# Patient Record
Sex: MALE | Race: WHITE | Employment: OTHER | URBAN - METROPOLITAN AREA
[De-identification: names, ages, dates, MRNs, and addresses within clinical notes are randomized per-mention and may not be internally consistent; named-entity substitution may affect disease eponyms.]

---

## 2017-02-03 ENCOUNTER — OFFICE VISIT (OUTPATIENT)
Dept: INTERNAL MEDICINE CLINIC | Age: 62
End: 2017-02-03

## 2017-02-03 VITALS
TEMPERATURE: 98.5 F | WEIGHT: 178 LBS | BODY MASS INDEX: 24.92 KG/M2 | DIASTOLIC BLOOD PRESSURE: 81 MMHG | RESPIRATION RATE: 12 BRPM | SYSTOLIC BLOOD PRESSURE: 141 MMHG | HEIGHT: 71 IN | HEART RATE: 62 BPM

## 2017-02-03 DIAGNOSIS — R10.32 COLICKY LLQ ABDOMINAL PAIN: Primary | ICD-10-CM

## 2017-02-03 DIAGNOSIS — K59.00 CONSTIPATION, UNSPECIFIED CONSTIPATION TYPE: ICD-10-CM

## 2017-02-03 RX ORDER — POLYETHYLENE GLYCOL 3350 17 G/17G
17 POWDER, FOR SOLUTION ORAL DAILY
Qty: 225 G | Refills: 3
Start: 2017-02-03 | End: 2019-07-17

## 2017-02-03 RX ORDER — AMOXICILLIN 250 MG
1 CAPSULE ORAL DAILY
COMMUNITY
End: 2019-07-17

## 2017-02-03 NOTE — MR AVS SNAPSHOT
Visit Information Date & Time Provider Department Dept. Phone Encounter #  
 2/3/2017  1:00 PM Artis Meyers MD [facility] [address] [id number] Your Appointments 4/5/2017 11:00 AM  
Office Visit with KATIA Gutierrez [address] [facility]) Appt Note: est pt: 6 mo skin exam  
 [facility] [address]  
[address] [place] [id number] Upcoming Health Maintenance Date Due Hepatitis C Screening 1955 INFLUENZA AGE 9 TO ADULT 8/1/2016 ZOSTER VACCINE AGE 60> 3/14/2017* COLONOSCOPY 3/14/2017* Pneumococcal 19-64 Medium Risk (1 of 1 - PPSV23) 11/9/2018* DTaP/Tdap/Td series (2 - Td) 6/3/2025 *Topic was postponed. The date shown is not the original due date. Allergies as of 2/3/2017  Review Complete On: 2/3/2017 By: Artis Meyers MD  
 No Known Allergies Current Immunizations  Reviewed on 12/14/2016 Name Date Td 1/1/2005 Tdap 6/3/2015 Not reviewed this visit You Were Diagnosed With   
  
 Codes Comments Colicky LLQ abdominal pain    -  Primary ICD-10-CM: R10.32 
ICD-9-CM: 789.04 Constipation, unspecified constipation type     ICD-10-CM: K59.00 ICD-9-CM: 564.00 Vitals BP Pulse Temp Resp Height(growth percentile) Weight(growth percentile) 141/81 (BP 1 Location: Left arm, BP Patient Position: Sitting) 62 98.5 °F (36.9 °C) 12 5' 11\" (1.803 m) 178 lb (80.7 kg) BMI Smoking Status 24.83 kg/m2 Current Every Day Smoker Vitals History BMI and BSA Data Body Mass Index Body Surface Area  
 24.83 kg/m 2 2.01 m 2 Preferred Pharmacy Pharmacy Name Phone [id number], [address] [address] [phone] Your Updated Medication List  
  
   
 This list is accurate as of: 2/3/17  1:38 PM.  Always use your most recent med list.  
  
  
  
  
 co-enzyme Q-10 100 mg capsule Commonly known as:  CO Q-10 Take 1 Cap by mouth daily. fluorouracil 5 % chemo cream  
Commonly known as:  EFUDEX Apply  to affected area two (2) times a day. polyethylene glycol 17 gram/dose powder Commonly known as:  Peng Chance Take 17 g by mouth daily. pravastatin 20 mg tablet Commonly known as:  PRAVACHOL  
TAKE 1 TABLET BY MOUTH AT BEDTIME  
  
 PROBIOTIC PO Take  by mouth. SENOKOT-S 8.6-50 mg per tablet Generic drug:  senna-docusate Take 1 Tab by mouth daily. TURMERIC (BULK)  
by Does Not Apply route. Introducing [facility] & HEALTH SERVICES! Norma Lora introduces [employer] patient portal. Now you can access parts of your medical record, email your doctor's office, and request medication refills online. 1. In your internet browser, go to [email or web address] 2. Click on the First Time User? Click Here link in the Sign In box. You will see the New Member Sign Up page. 3. Enter your [employer] Access Code exactly as it appears below. You will not need to use this code after youve completed the sign-up process. If you do not sign up before the expiration date, you must request a new code. · [employer] Access Code: [email or web address] Expires: 5/4/2017  1:24 PM 
 
4. Enter the last four digits of your Social Security Number (xxxx) and Date of Birth (mm/dd/yyyy) as indicated and click Submit. You will be taken to the next sign-up page. 5. Create a Sky [employer]est ID. This will be your [employer] login ID and cannot be changed, so think of one that is secure and easy to remember. 6. Create a [employer] password. You can change your password at any time. 7. Enter your Password Reset Question and Answer. This can be used at a later time if you forget your password. 8. Enter your e-mail address.  You will receive e-mail notification when new information is available in [employer]. 9. Click Sign Up. You can now view and download portions of your medical record. 10. Click the Download Summary menu link to download a portable copy of your medical information. If you have questions, please visit the Frequently Asked Questions section of the [employer] website. Remember, [employer] is NOT to be used for urgent needs. For medical emergencies, dial 911. Now available from your iPhone and Android! Please provide this summary of care documentation to your next provider. Your primary care clinician is listed as Prisca Boyle. If you have any questions after today's visit, please call [phone].

## 2017-02-15 ENCOUNTER — TELEPHONE (OUTPATIENT)
Dept: INTERNAL MEDICINE CLINIC | Age: 62
End: 2017-02-15

## 2017-02-15 DIAGNOSIS — R14.0 ABDOMINAL BLOATING: Primary | ICD-10-CM

## 2017-02-15 NOTE — TELEPHONE ENCOUNTER
Please make sure he has scheduled a GI visit since colonoscopy if needed to rule out mass or partial obstriction. I ordered a celiac antibody blood test which may help test for gluten intolerance. Can try a lactose -free diet for 48 hours, then drink 1 quart of milk and see if symptoms occur. This will test for lactose intolerance.    Needs to see Allergist for wheat or other testing

## 2017-02-22 ENCOUNTER — TELEPHONE (OUTPATIENT)
Dept: INTERNAL MEDICINE CLINIC | Age: 62
End: 2017-02-22

## 2017-02-22 NOTE — TELEPHONE ENCOUNTER
----- Message from Pema Godinez sent at 2/22/2017 11:28 AM EST -----  Regarding: Dr. Bacilio Carrington/Telephone  Contact: [phone]  Patient returning call to Nurse Laura Hong) from last thursday. Best contact number is [phone].

## 2017-02-27 ENCOUNTER — TELEPHONE (OUTPATIENT)
Dept: INTERNAL MEDICINE CLINIC | Age: 62
End: 2017-02-27

## 2017-02-27 NOTE — TELEPHONE ENCOUNTER
----- Message from Jeri Caro sent at 2/27/2017 11:44 AM EST -----  Regarding: Dr Reilly  Contact: [phone]  Pt is returning call from the office

## 2017-02-27 NOTE — TELEPHONE ENCOUNTER
Referred to allergist , Dr Addison Gold , pt will make appointment . Advised to have Celiac test done , will come for labs.

## 2017-03-16 PROBLEM — D12.8 TUBULAR ADENOMA OF RECTUM: Status: ACTIVE | Noted: 2017-02-01

## 2017-04-05 ENCOUNTER — OFFICE VISIT (OUTPATIENT)
Dept: DERMATOLOGY | Facility: AMBULATORY SURGERY CENTER | Age: 62
End: 2017-04-05

## 2017-04-05 ENCOUNTER — HOSPITAL ENCOUNTER (OUTPATIENT)
Dept: LAB | Age: 62
Discharge: HOME | End: 2017-04-05

## 2017-04-05 VITALS
BODY MASS INDEX: 24.92 KG/M2 | TEMPERATURE: 98.3 F | DIASTOLIC BLOOD PRESSURE: 71 MMHG | RESPIRATION RATE: 19 BRPM | SYSTOLIC BLOOD PRESSURE: 127 MMHG | OXYGEN SATURATION: 98 % | WEIGHT: 178 LBS | HEIGHT: 71 IN | HEART RATE: 56 BPM

## 2017-04-05 DIAGNOSIS — D22.9 MULTIPLE BENIGN NEVI: ICD-10-CM

## 2017-04-05 DIAGNOSIS — D48.5 NEOPLASM OF UNCERTAIN BEHAVIOR OF SKIN OF LOWER LEG: Primary | ICD-10-CM

## 2017-04-05 DIAGNOSIS — L81.4 LENTIGINES: ICD-10-CM

## 2017-04-05 DIAGNOSIS — D18.01 CHERRY ANGIOMA: ICD-10-CM

## 2017-04-05 DIAGNOSIS — L82.1 SEBORRHEIC KERATOSES: ICD-10-CM

## 2017-04-05 DIAGNOSIS — L90.5 SCAR: ICD-10-CM

## 2017-04-05 DIAGNOSIS — L57.0 ACTINIC KERATOSIS: ICD-10-CM

## 2017-04-05 DIAGNOSIS — Z85.828 HISTORY OF NONMELANOMA SKIN CANCER: ICD-10-CM

## 2017-04-05 NOTE — PROGRESS NOTES
Name: Justin Marquez       Age: 59 y.o. Date: 4/5/2017    Chief Complaint:   Chief Complaint   Patient presents with    Skin Exam     6 Month       Subjective:    HPI  Mr. Justin Marquez is a 59 y.o. male who presents for a full skin exam.  The patient's last skin exam was 6 months ago and the patient does have current complaints related to his skin. He reports he has noticed a pink area on the left posterior shoulder. He does not associate any symptoms with this. He does state he used cream on this for a few days before stopping after forgetting about his duration of treatment. He does state that he treated his right shoulder as asked with the cream.  He also notes a lesion on the left anterior shin that he states was due to yardwork he thinks. No other concerns today. Mr. Justin Marquez is feeling well and in his usual state of health today. The patient's pertinent skin history includes : History of melanoma of the back years ago. Also history of multiple nonmelanoma skin cancers treated multiple ways including excision curettage and creams throughout many years. History of actinic keratosis. ROS: Constitutional: Negative. Dermatological : positive for - skin lesion changes  negative for -rash  Reports has lost weight due to intestinal problems. He is on a strict diet right now. He underwent patch testing yesterday and some blood work to evaluate for allergens. Social History     Social History    Marital status:      Spouse name: N/A    Number of children: N/A    Years of education: N/A     Occupational History    Not on file.      Social History Main Topics    Smoking status: Current Every Day Smoker     Packs/day: 0.10     Types: Cigarettes    Smokeless tobacco: Never Used      Comment: 4 cig per night    Alcohol use 8.0 oz/week     2 Glasses of wine, 14 Standard drinks or equivalent per week      Comment: glass of wine at dinner    Drug use: No    Sexual activity: Not on file     Other Topics Concern    Not on file     Social History Narrative       Family History   Problem Relation Age of Onset    Cancer Father      melanoma    Hypertension Maternal Grandmother     Heart Disease Maternal Grandmother     Heart Disease Maternal Grandfather 80       Past Medical History:   Diagnosis Date    Basal cell carcinoma of back 11/2013    Dr. Bonne Holter. Dr. Mar Charles, 2000    x2    Dyslexia     Family history of skin cancer     Cornel's disease     Dr. Abbey Wagneradalajanice Headache 9/9/13    severe right-sided ha neck pain. CTA normal    Hypercholesteremia     start pravastatin 11/2013    Melanoma in situ ([address] 11/2013    Dr. Kvng Springer. excisions 12/2013, 5/2014    Neck pain on right side 9/9/2013    Prostate enlargement     1+ 6/3/15.  stable 12/2016    Skin cancer     multiple NMSC    Sun-damaged skin     Sunburn, blistering     Tubular adenoma of rectum 02/2017    repeat 9804    Umbilical hernia        Past Surgical History:   Procedure Laterality Date    HX COLONOSCOPY  02/20/2017    10 mm rectal tubular adenoma. repeat 2/2020 (3 years)    HX ORTHOPAEDIC  1955    toe tumor, left big toe as baby    HX OTHER SURGICAL  11/26/13    basal cell removed        Current Outpatient Prescriptions   Medication Sig Dispense Refill    LACTOBACILLUS ACIDOPHILUS (PROBIOTIC PO) Take  by mouth.  TURMERIC, BULK, by Does Not Apply route.  senna-docusate (SENOKOT-S) 8.6-50 mg per tablet Take 1 Tab by mouth daily.  co-enzyme Q-10 (CO Q-10) 100 mg capsule Take 1 Cap by mouth daily. 30 Cap 11    pravastatin (PRAVACHOL) 20 mg tablet TAKE 1 TABLET BY MOUTH AT BEDTIME 30 Tab 5    fluorouracil (EFUDEX) 5 % chemo cream Apply  to affected area two (2) times a day. 40 g 0    polyethylene glycol (MIRALAX) 17 gram/dose powder Take 17 g by mouth daily.  225 g 3       No Known Allergies      Objective:    Visit Vitals    /71 (BP 1 Location: Left arm, BP Patient Position: Sitting)    Pulse (!) 56    Temp 98.3 °F (36.8 °C) (Oral)    Resp 19    Ht 5' 11\" (1.803 m)    Wt 80.7 kg (178 lb)    SpO2 98%    BMI 24.83 kg/m2       Sandra Polanco is a 59 y.o. male who appears well and in no distress. He is awake, alert, and oriented. There is no preauricular, submandibular, or cervical lymphadenopathy. A skin examination was performed including his scalp, face (including eyelids), ears, neck, chest, back, abdomen, upper extremities (including digits/nails), lower extremities, breasts, buttocks; genital skin was not examined. He is heavily freckled with sun damaged skin. He has tanned skin on the posterior neck and face. There are a few thin small actinic keratoses on the forehead. There is a pink shiny macule on the right lower cheek with telangiectasias concerning for basal cell carcinoma versus telangiectatic matte. There are multiple well-healed scars on the trunk and extremities without evidence of lesion recurrence. On the left anterior shin is a 7 x 5 mm erythematous and pigmented papule most consistent with basal cell carcinoma. There is some surface ulceration as well. He has scattered waxy macules and keratotic papules consistent with seborrheic keratosis, including some that are darkly pigmented. There are scattered cherry angiomas. He has medium brown junctional nevi without concerning features. Assessment/Plan: 1. Neoplasm of Uncertain Behavior, left shin, favor BCC. The differential diagnoses were discussed. A shave biopsy followed by curettage was advised to address this lesion with malignant destruction. The procedure was reviewed and verbal and written consent were obtained. The risks of pain, bleeding, infection, scar, and recurrence of the lesion were discussed. I performed the procedure. The site was cleansed and anesthetized with 1% Lidocaine with Epinephrine 1:100,000.   A shave biopsy was performed to remove a portion of the lesion for pathology followed by curettage of the base and a 2 mm margin, resulting in a post curettage defect size of 9 x 11 mm. Drysol was used for hemostasis. The wound was bandaged and care reviewed. The specimen was sent to pathology. I will contact the patient with the results and any further treatment that may be necessary. [facility]   OFFICE PROCEDURE PROGRESS NOTE   Chart reviewed for the following:   Pako PEREZ, have reviewed the History, Physical and updated the Allergic reactions for Santi-Tarik. TIME OUT performed immediately prior to start of procedure:   Tiffani PEREZ, have performed the following reviews on Annatachar-Tarik   prior to the start of the procedure:     * Patient was identified by name and date of birth   * Agreement on procedure being performed was verified   * Risks and Benefits explained to the patient   * Procedure site verified and marked as necessary   * Patient was positioned for comfort   * Consent was signed and verified     Time: 1030  Date of procedure: 4/5/2017  Procedure performed by: Robert Washington. Trae Nicholas  Provider assisted by: lpn   Patient assisted by: self   How tolerated by patient: tolerated the procedure well with no complications   Comments: none    2. Actinic Keratoses. The diagnosis of this precancerous lesion related to sun exposure was reviewed. Small - treatment deferred today. 3. Solar lentigos. The diagnosis and relationship to sun exposure was reviewed. Sun protection advised. 4. Normal nevi. The diagnosis of normal nevi was reviewed. I discussed sun protection, sunscreen use, the warning signs of skin cancer, the need for self-skin examinations, and the need for regular practitioner exams every 6 months. The patient should follow up sooner as needed if new, changing, or symptomatic skin lesions arise. 5. Personal history of skin cancer.   I discussed sun protection, sunscreen use, the warning signs of skin cancer, the need for self-skin examinations, and the need for regular practitioner exams every 6 months. The patient should follow up sooner as needed if new, changing, or symptomatic skin lesions arise. 6. Cherry angiomas. The diagnosis was reviewed and the patient was reassured that no treatment is needed for these benign lesions.

## 2017-04-05 NOTE — PROGRESS NOTES
Mr. Drew Rdz comes in for follow-up. He is a 68-year-old man with a history of numerous nonmelanoma skin cancers and a very distant history of multiple melanoma on his upper back. Surgery has been performed for skin cancers, he has also used topical Aldara on superficial basal cell carcinomas on his back. He has a pink area on his right cheek that comes and goes, not previously treated. He has a few pink patches on his lower legs. He is feeling well and in his usual state of health today. He has no pain, no current illnesses, no other skin concerns. His allergies medications medical and social history are reviewed by me today. I have reviewed the history, physical exam, assessment and plan by Tom Figueroa NP and agree. He is awake alert man who appears well. I examined his face ears neck back chest arms hands and anterior lower extremities. He has well healed surgical scars on his back without evidence of recurrent skin cancer. His right lower cheek shows a pink macule predominantly vascular in appearance, vascular pattern is not consistent with basal cell carcinoma and is more telangiectatic in nature. He has a pearly papule on his left anterior shin concerning for new basal cell carcinoma. He is scattered pigmented nevi and seborrheic keratoses, none with concerning features. Each diagnosis was discussed. He is reassured that surgical scars are well-healed without evidence of recurrence. Suspected new basal cell carcinoma on the left leg was treated with curettage for destruction. The right cheek pink lesion was photographed, this will be evaluated in his next visit, biopsy would be considered if it is persistent.

## 2017-04-05 NOTE — MR AVS SNAPSHOT
Visit Information Date & Time Provider Department Dept. Phone Encounter #  
 4/5/2017 10:00 AM Violet Forbes NP Marcel Mosley57 [phone] Upcoming Health Maintenance Date Due ZOSTER VACCINE AGE 60> 11/8/2013 INFLUENZA AGE 9 TO ADULT 8/1/2016 Pneumococcal 19-64 Medium Risk (1 of 1 - PPSV23) 11/9/2018* COLONOSCOPY 2/14/2020 DTaP/Tdap/Td series (2 - Td) 6/3/2025 *Topic was postponed. The date shown is not the original due date. Allergies as of 4/5/2017  Review Complete On: 4/5/2017 By: Collin Perez LPN No Known Allergies Current Immunizations  Reviewed on 12/14/2016 Name Date Td 1/1/2005 Tdap 6/3/2015 Not reviewed this visit Vitals BP Pulse Temp Resp Height(growth percentile) Weight(growth percentile) 127/71 (BP 1 Location: Left arm, BP Patient Position: Sitting) (!) 56 98.3 °F (36.8 °C) (Oral) 19 5' 11\" (1.803 m) 178 lb (80.7 kg) SpO2 BMI Smoking Status 98% 24.83 kg/m2 Current Every Day Smoker Vitals History BMI and BSA Data Body Mass Index Body Surface Area  
 24.83 kg/m 2 2.01 m 2 Preferred Pharmacy Pharmacy Name Phone [id number], [facility] [address] [phone] Your Updated Medication List  
  
   
This list is accurate as of: 4/5/17 10:14 AM.  Always use your most recent med list.  
  
  
  
  
 co-enzyme Q-10 100 mg capsule Commonly known as:  CO Q-10 Take 1 Cap by mouth daily. fluorouracil 5 % chemo cream  
Commonly known as:  EFUDEX Apply  to affected area two (2) times a day. polyethylene glycol 17 gram/dose powder Commonly known as:  Cj Marek Take 17 g by mouth daily. pravastatin 20 mg tablet Commonly known as:  PRAVACHOL  
TAKE 1 TABLET BY MOUTH AT BEDTIME  
  
 PROBIOTIC PO Take  by mouth. SENOKOT-S 8.6-50 mg per tablet Generic drug:  senna-docusate Take 1 Tab by mouth daily. TURMERIC (BULK)  
by Does Not Apply route. Patient Instructions Self Skin Exam and Sunscreens Early detection and treatment is essential in the treatment of all forms of skin cancer. If caught early, all forms of skin cancer are curable. In addition to your regular visits, you should perform a monthly skin examination. Over time, you become familiar with what is normally found on your skin and can identify new or suspicious spots. One of the screening tools you can use to assess your skin is to follow the ABCDEs: 
 
A= Asymmetry (One half is unlike the other half) B= Border (An irregular, scalloped or poorly defined edge) C= Color (Is varied from one area to another, has shades of tan, brown/ black,       white, red or blue) D= Diameter (Spots larger than 6mm or a pencil eraser) E= Evolving (New spots or one that is changing in size, shape, or color) A follow- up interval will be customized based on your history of skin cancer or level of skin damage and risk factors. In any case, if you notice a suspicious or new spot, an appointment should be arranged between regular visits. Everyone should use sunscreen and sun-safe practices, which is especially important for those with a personal or family history of skin cancer. Suggestions for this include: 1. Use daily moisturizers containing SPF 30 or higher. 2. Wear long sleeve clothing with UPF ratings and a broad-brimmed hat. 3. Apply sunscreen with SPF 30 or higher to all sun exposed areas if you are going to be in the sun. A broad spectrum UVA/ UVB sunscreen is best.  Dont forget to REAPPLY every two hours or more often if swimming or sweating! 4. Avoid outside activities during peak sun hours, especially in the summer (10am- 2pm). 5. DO NOT use tanning beds.  
 
Using sunscreen and sun-safe practices can help reduce the likelihood of developing skin cancer or additional skin cancers in those previously diagnosed. Introducing \[facility]\"" & HEALTH SERVICES! Sravan Cannon introduces [employer] patient portal. Now you can access parts of your medical record, email your doctor's office, and request medication refills online. 1. In your internet browser, go to [email or web address] 2. Click on the First Time User? Click Here link in the Sign In box. You will see the New Member Sign Up page. 3. Enter your [employer] Access Code exactly as it appears below. You will not need to use this code after youve completed the sign-up process. If you do not sign up before the expiration date, you must request a new code. · [employer] Access Code: [email or web address] Expires: 5/4/2017  2:24 PM 
 
4. Enter the last four digits of your Social Security Number (xxxx) and Date of Birth (mm/dd/yyyy) as indicated and click Submit. You will be taken to the next sign-up page. 5. Create a [employer] ID. This will be your [employer] login ID and cannot be changed, so think of one that is secure and easy to remember. 6. Create a [employer] password. You can change your password at any time. 7. Enter your Password Reset Question and Answer. This can be used at a later time if you forget your password. 8. Enter your e-mail address. You will receive e-mail notification when new information is available in [address] 9. Click Sign Up. You can now view and download portions of your medical record. 10. Click the Download Summary menu link to download a portable copy of your medical information. If you have questions, please visit the Frequently Asked Questions section of the [employer] website. Remember, [employer] is NOT to be used for urgent needs. For medical emergencies, dial 911. Now available from your iPhone and Android! Please provide this summary of care documentation to your next provider. Your primary care clinician is listed as Ibis Garrett. If you have any questions after today's visit, please call [phone].

## 2017-04-06 NOTE — PROGRESS NOTES
I spoke with the patient and he is aware of the results. This was treated with curettage at the visit therefore no additional tx needed. He states the area is healing well. F/up 6 mos.

## 2017-08-10 RX ORDER — PRAVASTATIN SODIUM 20 MG/1
TABLET ORAL
Qty: 30 TAB | Refills: 4 | Status: SHIPPED | OUTPATIENT
Start: 2017-08-10 | End: 2019-06-28 | Stop reason: SDUPTHER

## 2017-12-13 ENCOUNTER — HOSPITAL ENCOUNTER (OUTPATIENT)
Dept: LAB | Age: 62
Discharge: HOME | End: 2017-12-13

## 2017-12-13 ENCOUNTER — OFFICE VISIT (OUTPATIENT)
Dept: DERMATOLOGY | Facility: AMBULATORY SURGERY CENTER | Age: 62
End: 2017-12-13

## 2017-12-13 VITALS
HEART RATE: 65 BPM | WEIGHT: 182 LBS | BODY MASS INDEX: 25.48 KG/M2 | RESPIRATION RATE: 16 BRPM | OXYGEN SATURATION: 98 % | DIASTOLIC BLOOD PRESSURE: 78 MMHG | HEIGHT: 71 IN | SYSTOLIC BLOOD PRESSURE: 118 MMHG | TEMPERATURE: 98.8 F

## 2017-12-13 DIAGNOSIS — L98.9 SKIN LESION OF FACE: ICD-10-CM

## 2017-12-13 DIAGNOSIS — Z85.828 FOLLOW-UP SURVEILLANCE OF SKIN CANCER, ENCOUNTER FOR: ICD-10-CM

## 2017-12-13 DIAGNOSIS — L81.4 LENTIGINES: ICD-10-CM

## 2017-12-13 DIAGNOSIS — D48.5 NEOPLASM OF UNCERTAIN BEHAVIOR OF SKIN OF CHEST: Primary | ICD-10-CM

## 2017-12-13 DIAGNOSIS — L82.1 SEBORRHEIC KERATOSES: ICD-10-CM

## 2017-12-13 DIAGNOSIS — Z85.828 HISTORY OF NONMELANOMA SKIN CANCER: ICD-10-CM

## 2017-12-13 DIAGNOSIS — L90.5 SCAR CONDITION AND FIBROSIS OF SKIN: ICD-10-CM

## 2017-12-13 DIAGNOSIS — Z08 FOLLOW-UP SURVEILLANCE OF SKIN CANCER, ENCOUNTER FOR: ICD-10-CM

## 2017-12-13 DIAGNOSIS — D18.01 CHERRY ANGIOMA: ICD-10-CM

## 2017-12-13 DIAGNOSIS — L11.1 GROVER'S DISEASE: ICD-10-CM

## 2017-12-13 NOTE — PROGRESS NOTES
Mr. Shiva Churchill comes in for follow-up. History of multiple basal cell carcinomas. He has recently used topical 5 fluorouracil to treat actinic keratosis on his right cheek and superficial basal cell carcinoma on his left shoulder with good response in his opinion. He is feeling well and in his usual state of health today. He has no pain, no current illnesses, no other skin concerns. His allergies medications medical and social history are reviewed by me today. I have reviewed the history, physical exam, assessment and plan by Brady Solano NP and agree. He is awake alert man who appears well. There is no preauricular, submandibular, or cervical lymphadenopathy. A full skin examination was performed including his frontal scalp, face, ears, neck, chest, back, abdomen, upper and lower extremities, breasts, buttocks; genital skin was not examined. He has multiple well-healed scars on his back where skin cancers have been treated surgically and topically no evidence of recurrence. His right chest has a 15 mm subtle pearly plaque inferior to a surgical scar. He has a small scar left medial cheek that on dermoscopy does not have features of basal cell carcinoma. He likely has 2 additional superficial basal cell carcinomas on his right anterior lower leg. 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 Recurrent basal cell carcinoma, right chest. Biopsy was performed today to confirm diagnosis and he will be treated next week with Mohs surgery if that diagnosis is confirmed. He will use topical 5 fluorouracil on his right anterior shin. Topical steroid prescribed for Afton's disease. The scar on his left medial cheek will be observed.

## 2017-12-13 NOTE — PROGRESS NOTES
Written by Rosemary Glass, as dictated by Virginia Reyez, [address]. Name: Elvia Gomez       Age: 57 y.o. Date: 12/13/2017    Chief Complaint:   Chief Complaint   Patient presents with    Skin Exam     6m f/u       Subjective:    HPI  Mr. Elvia Gomez is a 57 y.o. male who presents for a full skin exam.  The patient's last skin exam was on 4/5/17 and the patient does not have current complaints related to his skin. He had a lesion he put cream on since his last office visit. It has improved since then. He also put cream on a lesion on his right cheek for 10 days at the end of 10/2017. It became very inflamed so he stopped. He does not feel residual.  He noted two pink areas on the left    The patient's pertinent skin history includes : History of melanoma of the back years ago. Also history of multiple nonmelanoma skin cancers treated multiple ways including excision curettage and creams throughout many years. History of actinic keratosis. ROS: Constitutional: Negative. Dermatological : positive for - skin lesion changes      Social History     Social History    Marital status:      Spouse name: N/A    Number of children: N/A    Years of education: N/A     Occupational History    Not on file.      Social History Main Topics    Smoking status: Current Every Day Smoker     Packs/day: 0.10     Types: Cigarettes    Smokeless tobacco: Never Used      Comment: 4 cig per night    Alcohol use 8.0 oz/week     2 Glasses of wine, 14 Standard drinks or equivalent per week      Comment: glass of wine at dinner    Drug use: No    Sexual activity: Not on file     Other Topics Concern    Not on file     Social History Narrative       Family History   Problem Relation Age of Onset    Cancer Father      melanoma    Hypertension Maternal Grandmother     Heart Disease Maternal Grandmother     Heart Disease Maternal Grandfather 80       Past Medical History:   Diagnosis Date    Basal cell carcinoma of back 11/2013    Dr. Oscar Ramos. Dr. Santiago Lutz, 2000    x2    Dyslexia     Family history of skin cancer     [place]'s disease     Dr. Margareth Willard Headache 9/9/13    severe right-sided ha neck pain. CTA normal    Hypercholesteremia     start pravastatin 11/2013    Melanoma in situ ([address] 11/2013    Dr. Jaxon Peralta. excisions 12/2013, 5/2014    Neck pain on right side 9/9/2013    Prostate enlargement     1+ 6/3/15.  stable 12/2016    Skin cancer     multiple NMSC    Sun-damaged skin     Sunburn, blistering     Tubular adenoma of rectum 02/2017    repeat 9916    Umbilical hernia        Past Surgical History:   Procedure Laterality Date    HX COLONOSCOPY  02/20/2017    10 mm rectal tubular adenoma. repeat 2/2020 (3 years)    HX ORTHOPAEDIC  1955    toe tumor, left big toe as baby    HX OTHER SURGICAL  11/26/13    basal cell removed        Current Outpatient Prescriptions   Medication Sig Dispense Refill    pravastatin (PRAVACHOL) 20 mg tablet TAKE 1 TABLET BY MOUTH AT BEDTIME 30 Tab 4    LACTOBACILLUS ACIDOPHILUS (PROBIOTIC PO) Take  by mouth.  TURMERIC, BULK, by Does Not Apply route.  senna-docusate (SENOKOT-S) 8.6-50 mg per tablet Take 1 Tab by mouth daily.  polyethylene glycol (MIRALAX) 17 gram/dose powder Take 17 g by mouth daily. 225 g 3    co-enzyme Q-10 (CO Q-10) 100 mg capsule Take 1 Cap by mouth daily. 30 Cap 11    fluorouracil (EFUDEX) 5 % chemo cream Apply  to affected area two (2) times a day. 40 g 0       No Known Allergies      Objective:    Visit Vitals    Ht 5' 11\" (1.803 m)       Olivia Lazcano is a 57 y.o. male who appears well and in no distress. He is awake, alert, and oriented. There is no preauricular, submandibular, or cervical lymphadenopathy.   A skin examination was performed including his scalp, face (including eyelids), ears, neck, chest, back, abdomen, upper extremities (including digits/nails), lower extremities, breasts, buttocks; genital skin was not examined. .  He has a 1.5 x 1 cm shiny pink papule on the right chest at the inferior aspect of a scar concerning for new or recurrent BCC. He has pink intradermal nevi and brown junctional nevi, no concerning features. He has inflammatory, scaly macules and papules on the trunk consistent with [place]'s disease. He has a pink shiny maculopapular lesion on the right anterior shin and right medial anterior shin concerning for superficial BCC. He has a 3 x 3 mm pink shiny maculopapular lesion at the right medial orbital rim - inflammatory vs nevus vs BCC. This was photographed today for comparison. He has lentigines on sun exposed areas.   He has scattered red papules consistent with cherry angiomas. There are multiple well healed scars without evidence of recurrence as well. He has scattered stuck on waxy macules and keratotic papules consistent with SKs. Assessment/Plan:    Normal nevi. The diagnosis of normal nevi was reviewed. I discussed sun protection, sunscreen use, the warning signs of skin cancer, the need for self-skin examinations, and the need for regular practitioner exams every 6 months. The patient should follow up sooner as needed if new, changing, or symptomatic skin lesions arise. Cherry angiomas. The diagnosis was reviewed and the patient was reassured that no treatment is needed for these benign lesions. Personal history of skin cancer. I discussed sun protection, sunscreen use, the warning signs of skin cancer, the need for self-skin examinations, and the need for regular practitioner exams every 6 months. The patient should follow up sooner as needed if new, changing, or symptomatic skin lesions arise. Solar lentigos. The diagnosis and relationship to sun exposure was reviewed. Sun protection advised. Suspected basal cell carcinoma, right anterior shin. The differential diagnoses were discussed.  Patient is going to use 5% Fluorouracil cream on these areas. Neoplasm of Uncertain Behavior, left chest.  The differential diagnoses were discussed. A shave biopsy was advised to sample this lesion. The procedure was reviewed and verbal and written consent were obtained. The risks of pain, bleeding, infection, and scar were discussed. The patient is aware that this is a sample and is intended for diagnosis and not therapy of the skin lesion. I performed the procedure. The site was cleansed and anesthetized with 1% Lidocaine with Epinephrine 1:100,000. A shave biopsy was performed to sample the lesion. Drysol was used for hemostasis. The wound was bandaged and care reviewed. The specimen was sent to pathology. I will contact the patient with the results and any further treatment that may be necessary. Seborrheic keratoses. The diagnosis was reviewed and the patient was reassured that no treatment is needed for these benign lesions. [place]'s disease. Diagnosis discussed. [facility]   OFFICE PROCEDURE PROGRESS NOTE   Chart reviewed for the following:   Pako PEREZ, have reviewed the History, Physical and updated the Allergic reactions for Bhavik. TIME OUT performed immediately prior to start of procedure:   Tiffani PEREZ, have performed the following reviews on Bhavik   prior to the start of the procedure:     * Patient was identified by name and date of birth   * Agreement on procedure being performed was verified   * Risks and Benefits explained to the patient   * Procedure site verified and marked as necessary   * Patient was positioned for comfort   * Consent was signed and verified     Time: 12:10 PM  Date of procedure: 12/13/2017  Procedure performed by: Tray Poole.  Ellis Plascencia  Provider assisted by: Xin Stevenson LPN  Patient assisted by: self   How tolerated by patient: tolerated the procedure well with no complications Comments: none    This plan was reviewed with the patient and patient agrees. All questions were answered. This scribe documentation was reviewed by me and accurately reflects the examination and decisions made by me.

## 2017-12-20 ENCOUNTER — OFFICE VISIT (OUTPATIENT)
Dept: DERMATOLOGY | Facility: AMBULATORY SURGERY CENTER | Age: 62
End: 2017-12-20

## 2017-12-20 VITALS
HEIGHT: 71 IN | SYSTOLIC BLOOD PRESSURE: 120 MMHG | TEMPERATURE: 98.5 F | OXYGEN SATURATION: 99 % | DIASTOLIC BLOOD PRESSURE: 74 MMHG | BODY MASS INDEX: 25.48 KG/M2 | WEIGHT: 182 LBS | HEART RATE: 65 BPM

## 2017-12-20 DIAGNOSIS — C44.519 BASAL CELL CARCINOMA OF CHEST: Primary | ICD-10-CM

## 2017-12-20 LAB
DX ICD CODE: NORMAL
PATH REPORT.FINAL DX SPEC: NORMAL
PATH REPORT.GROSS SPEC: NORMAL
PATH REPORT.MICROSCOPIC SPEC OTHER STN: NORMAL
PATH REPORT.RELEVANT HX SPEC: NORMAL
PATH REPORT.SITE OF ORIGIN SPEC: NORMAL
PATHOLOGIST NAME: NORMAL
PAYMENT PROCEDURE: NORMAL

## 2017-12-20 NOTE — PATIENT INSTRUCTIONS
WOUND CARE INSTRUCTIONS    1. Keep the dressing clean and dry and do not remove for 48 hours. 2. Then change the dressing once a day as follows:  a. Wash hands before and after each dressing change. b. Remove dressing and wash site gently with mild soap and water, rinse, and pat dry.  c. Apply an ointment (Bacitracin, Polysporin, Neosporin, Petroleum jelly or Aquaphor). d. Apply a non-stick (Telfa) dressing or Band-Aid to cover the wound. Remove pressure bandage on friday, then wash gently and apply a thin layer Vaseline and a band-aid to site daily for 1 week. 3. Watch for:  BLEEDING: A small amount of drainage may occur. If bleeding occurs, elevate and rest the surgery site. Apply gauze and steady pressure for 15 minutes. If bleeding continues, call this office. INFECTION: Signs of infection include increased redness, pain, warmth, drainage of pus, and fever. If this occurs, call this office. 4. Special Instructions (follow any that are checked):  · [x] You have stitches that DO NOT need to be removed. · [x] Avoid bending at the waist and heavy lifting for two days. · [] Sleep with your head elevated for the next two nights. · [x] Rest the surgery site and keep it elevated as much as possible for two days. · [] You may apply an ice-pack for 10-15 minutes every waking hour for the rest of the day. · [] Eat a soft diet and avoid hot food and hot drinks for the rest of the day. · [] Other instructions: Follow up as directed. Take Tylenol or Ibuprofen for pain as needed. Once the site is healed with no remaining bandages or open areas, protect your surgical site and scar from the sun, as this area will be more sensitive. Use a broad spectrum sunscreen SPF 30 or higher daily, and a chemical free product (one containing zinc oxide or titanium dioxide) is a good choice if the area is sensitive.     You may begin to gently massage the surgical site in 2-3 weeks, rubbing in a circular motion along the scar. This can help reduce swelling and thickness of a scar. A scar cream may be used beginnning 1 month after the surgery. If you have any questions or concerns, please call our office Monday through Friday at [phone].

## 2017-12-20 NOTE — PROGRESS NOTES
This note is written by Elizabeth Hartman, as dictated by Carroll Brian. Hayden Licea MD.    CC: Recurrent suspected basal cell carcinoma on the right chest     History of present illness:     Colten Colon is a 57 y.o. male referred by Laya Adams DNP. He has a recurrent suspected basal cell carcinoma on the right chest. This lesion has had prior treatment of two excisions. A biopsy was performed by Laya Adams DNP to sample this lesion on his right chest, but a pathology report is still pending. He is feeling well and in his usual state of health today. He has no pain, no current illnesses, no other skin concerns. His allergies, medications, medical, and social history are reviewed by me today. He has a history of melanoma skin cancer, multiple basal cell carcinomas, and actinic keratoses. He has been using 5-fluorouracil on his right cheek to treat actinic keratoses and on his left shoulder to treat a superficial basal cell carcinoma, and has reported resolve. 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 He has been using 5-fluorouracil on his right anterior shin to treat two basal cell carcinomas. Exam:     He is an awake, alert, and oriented 57 y.o. male who appears well and in no distress. There is no preauricular, submandibular, or cervical lymphadenopathy. I examined his right chest. He has a 20 x 16 mm indistinct pink plaque inferior to a surgical scar on his right chest. He confirms location. Assessment/plan:    1. Neoplasm of Uncertain Behavior, right chest, favor BCC. The differential diagnoses were discussed. A shave biopsy was advised to sample this lesion. The procedure was reviewed and verbal and written consent were obtained. The risks of pain, bleeding, infection, and scar were discussed. The patient is aware that this is a sample and is intended for diagnosis and not therapy of the skin lesion. I performed the procedure.   The site was cleansed and anesthetized with 1% Lidocaine with Epinephrine 1:100,000. A shave biopsy was performed to sample the lesion. Drysol was used for hemostasis. The wound was bandaged and care reviewed. The specimen was sent to and processed in the Mohs Laboratory. Dermatology Pathology Report  [facility], [address], [address] [place] [address]      Name: Alan Paulino    YOB: 1955    Specimen #:     Date: 12/20/2017      Submitting physician: Deuce Springer MD      Source of tissue: Right chest     Clinical Diagnosis: Recurrent basal cell carcinoma     Gross description:  Received fresh is a 7 x 4 mm shave biopsy of skin. The specimen is bisected and processed for frozen vertical sections. Microscopic description: This hematoxylin and eosin stained specimen shows nests of basaloid cells in the dermis    Pathology diagnosis: Infiltrative basal cell carcinoma       Deuce Springer MD    2. Basal cell carcinoma, right chest. I discussed the diagnosis of basal cell carcinoma and summarized the pathology report. Mohs surgery is indicated by site, size, poor definition, recurrence, and histology. The procedure was discussed, verbal and written consent were obtained. I performed the procedure. Two stages were required to reach a tumor free plane. The surgical defect was managed with intermediate repair. There were no complications. He will follow up as needed as the site heals. Indications, risks, and options were discussed with Thao Shaikh preoperatively. Risks including, but not limited to: pain, bleeding, infection, tumor recurrence, scarring and damage to motor and/or sensory nerves, were discussed. Thao Shaikh chose Mohs surgery. Alan Paulino was an acceptable surgery candidate. Alan Paulino was placed in the appropriate position on the operating table in the Mohs surgery procedure room.  The area was prepped and draped in the standard manner. Gentian violet was used to outline the clinical margins of the tumor. Local anesthesia was then obtained. The grossly visible tumor was then removed, an underlying layer was excised and mapped according to the Mohs technique, and the individual specimens examined microscopically. The process was repeated until microscopic examination of the tissue specimens confirmed a tumor-free plane. Hemostasis was obtained with electrosurgery and pressure. The wound was covered between stages with moist saline gauze. The wound management options of second intent healing, layered closure, local flap, and/or full thickness skin graft were discussed. Florentin Santos understands the aims, risks, alternatives, and possible complications and elects to proceed with an intermediate layered closure. Wound margins were made vertical, edges undermined in the subcutaneous plane, standing cones corrected at both poles followed by layered closure. The wound was closed with buried 4-0 polysorb suture in the subcutis to reduce tension on the skin edges, and skin edges were approximated with 4-0 polysorb suture in the dermis to reduce tension on the epidermis. The final closure length was 12 mm. The wound was bandaged with steristrips, Telfa, gauze and Coverroll. Wound care instructions (written and verbal) and a follow up appointment were given to Florentin Santos before discharge. Florentin Santos was discharged in good condition. 3. History of melanoma skin cancer, basal cell carcinomas, and actinic keratoses. I discussed the diagnosis and recommend routine examinations with Mumtaz Allison DNP for surveillance. The documentation recorded by the scribe accurately reflects the service I personally performed and the decisions made by me. [facility]   OFFICE PROCEDURE PROGRESS NOTE     Chart reviewed for the following:     Nela Sharif MD, have reviewed the History, Physical and updated the Allergic reactions for [facility]    TIME OUT performed immediately prior to start of procedure:     Bina Long MD, have performed the following reviews on [facility] prior to the start of the procedure:     * Patient was identified by name and date of birth   * Agreement on procedure being performed was verified   * Risks and Benefits explained to the patient   * Procedure site verified and marked as necessary   * Patient was positioned for comfort   * Consent was signed and verified     Time: 9:29 AM   Date of procedure: 12/20/2017  Procedure performed by: August Long MD   Provider assisted by: LPN   Patient assisted by: self   How tolerated by patient: tolerated the procedure well with no complications   Comments: none

## 2017-12-20 NOTE — MR AVS SNAPSHOT
Visit Information Date & Time Provider Department Dept. Phone Encounter #  
 12/20/2017  9:30 AM [facility] [address] [phone] [id number] Upcoming Health Maintenance Date Due ZOSTER VACCINE AGE 60> 9/8/2013 Influenza Age 5 to Adult 8/1/2017 Pneumococcal 19-64 Medium Risk (1 of 1 - PPSV23) 11/9/2018* COLONOSCOPY 2/14/2020 DTaP/Tdap/Td series (2 - Td) 6/3/2025 *Topic was postponed. The date shown is not the original due date. Allergies as of 12/20/2017  Review Complete On: 12/13/2017 By: Juwan Eric NP No Known Allergies Current Immunizations  Reviewed on 12/14/2016 Name Date Td 1/1/2005 Tdap 6/3/2015 Not reviewed this visit Vitals BP Pulse Temp Height(growth percentile) Weight(growth percentile) SpO2  
 120/74 (BP 1 Location: Left arm, BP Patient Position: Sitting) 65 98.5 °F (36.9 °C) (Oral) 5' 11\" (1.803 m) 182 lb (82.6 kg) 99% BMI Smoking Status 25.38 kg/m2 Current Every Day Smoker BMI and BSA Data Body Mass Index Body Surface Area  
 25.38 kg/m 2 2.03 m 2 Preferred Pharmacy Pharmacy Name Phone [id number] [place] [address] [phone] Your Updated Medication List  
  
   
This list is accurate as of: 12/20/17 10:53 AM.  Always use your most recent med list.  
  
  
  
  
 co-enzyme Q-10 100 mg capsule Commonly known as:  CO Q-10 Take 1 Cap by mouth daily. fluorouracil 5 % chemo cream  
Commonly known as:  EFUDEX Apply  to affected area two (2) times a day. polyethylene glycol 17 gram/dose powder Commonly known as:  Waymond Mark Take 17 g by mouth daily. pravastatin 20 mg tablet Commonly known as:  PRAVACHOL  
TAKE 1 TABLET BY MOUTH AT BEDTIME  
  
 PROBIOTIC PO Take  by mouth. SENOKOT-S 8.6-50 mg per tablet Generic drug:  senna-docusate Take 1 Tab by mouth daily. TURMERIC (BULK)  
by Does Not Apply route. Patient Instructions WOUND CARE INSTRUCTIONS 1. Keep the dressing clean and dry and do not remove for 48 hours. 2. Then change the dressing once a day as follows: 
a. Wash hands before and after each dressing change. b. Remove dressing and wash site gently with mild soap and water, rinse, and pat dry. 
c. Apply an ointment (Bacitracin, Polysporin, Neosporin, Petroleum jelly or Aquaphor). d. Apply a non-stick (Telfa) dressing or Band-Aid to cover the wound. Remove pressure bandage on friday, then wash gently and apply a thin layer Vaseline and a band-aid to site daily for 1 week. 3. Watch for: BLEEDING: A small amount of drainage may occur. If bleeding occurs, elevate and rest the surgery site. Apply gauze and steady pressure for 15 minutes. If bleeding continues, call this office. INFECTION: Signs of infection include increased redness, pain, warmth, drainage of pus, and fever. If this occurs, call this office. 4. Special Instructions (follow any that are checked): ·  You have stitches that DO NOT need to be removed. ·  Avoid bending at the waist and heavy lifting for two days. ·  Sleep with your head elevated for the next two nights. ·  Rest the surgery site and keep it elevated as much as possible for two days. ·  You may apply an ice-pack for 10-15 minutes every waking hour for the rest of the day. ·  Eat a soft diet and avoid hot food and hot drinks for the rest of the day. ·  Other instructions: Follow up as directed. Take Tylenol or Ibuprofen for pain as needed. Once the site is healed with no remaining bandages or open areas, protect your surgical site and scar from the sun, as this area will be more sensitive.   Use a broad spectrum sunscreen SPF 30 or higher daily, and a chemical free product (one containing zinc oxide or titanium dioxide) is a good choice if the area is sensitive. You may begin to gently massage the surgical site in 2-3 weeks, rubbing in a circular motion along the scar. This can help reduce swelling and thickness of a scar. A scar cream may be used beginnning 1 month after the surgery. If you have any questions or concerns, please call our office Monday through Friday at [phone]. Introducing [facility] SERVICES! Matthew Wolfe introduces [employer] patient portal. Now you can access parts of your medical record, email your doctor's office, and request medication refills online. 1. In your internet browser, go to [email or web address]. [employer] 2. Click on the First Time User? Click Here link in the Sign In box. You will see the New Member Sign Up page. 3. Enter your [employer] Access Code exactly as it appears below. You will not need to use this code after youve completed the sign-up process. If you do not sign up before the expiration date, you must request a new code. · [employer] Access Code: [email or web address] Expires: 3/20/2018  9:46 AM 
 
4. Enter the last four digits of your Social Security Number (xxxx) and Date of Birth (mm/dd/yyyy) as indicated and click Submit. You will be taken to the next sign-up page. 5. Create a [employer] ID. This will be your [employer] login ID and cannot be changed, so think of one that is secure and easy to remember. 6. Create a [employer] password. You can change your password at any time. 7. Enter your Password Reset Question and Answer. This can be used at a later time if you forget your password. 8. Enter your e-mail address. You will receive e-mail notification when new information is available in [address] 9. Click Sign Up. You can now view and download portions of your medical record. 10. Click the Download Summary menu link to download a portable copy of your medical information.  
 
If you have questions, please visit the Frequently Asked Questions section of the [employer]. Remember, [employer] is NOT to be used for urgent needs. For medical emergencies, dial 911. Now available from your iPhone and Android! Please provide this summary of care documentation to your next provider. Your primary care clinician is listed as Lisa Gotti. If you have any questions after today's visit, please call [phone].

## 2019-04-17 ENCOUNTER — OFFICE VISIT (OUTPATIENT)
Dept: DERMATOLOGY | Facility: AMBULATORY SURGERY CENTER | Age: 64
End: 2019-04-17

## 2019-04-17 VITALS
TEMPERATURE: 98 F | RESPIRATION RATE: 12 BRPM | SYSTOLIC BLOOD PRESSURE: 122 MMHG | WEIGHT: 182 LBS | HEART RATE: 58 BPM | HEIGHT: 71 IN | BODY MASS INDEX: 25.48 KG/M2 | DIASTOLIC BLOOD PRESSURE: 80 MMHG | OXYGEN SATURATION: 97 %

## 2019-04-17 DIAGNOSIS — D22.9 MULTIPLE BENIGN NEVI: ICD-10-CM

## 2019-04-17 DIAGNOSIS — L81.4 LENTIGINES: ICD-10-CM

## 2019-04-17 DIAGNOSIS — L82.1 SEBORRHEIC KERATOSES: ICD-10-CM

## 2019-04-17 DIAGNOSIS — D18.01 CHERRY ANGIOMA: ICD-10-CM

## 2019-04-17 DIAGNOSIS — L57.0 ACTINIC KERATOSES: Primary | ICD-10-CM

## 2019-04-17 DIAGNOSIS — L11.1 GROVER'S DISEASE: ICD-10-CM

## 2019-04-17 DIAGNOSIS — Z85.828 HISTORY OF NONMELANOMA SKIN CANCER: ICD-10-CM

## 2019-04-17 NOTE — PROGRESS NOTES
Written by Luisa Suárez, as dictated by Roger Perez, [address]. Name: Nay Dunn       Age: 70 y.o. Date: 4/17/2019    Chief Complaint:   Chief Complaint   Patient presents with    Skin Exam     full skin exam-spot on left calf       Subjective:    HPI  Mr. Nay Dunn is a 70 y.o. male who presents for a full skin exam.  The patient's last skin exam was on 12/13/17 and the patient does have current complaints related to his skin. He reports a lesion on the right calf that he believes could be a skin cancer. It has been present for months and not gone away and he reports not trauma. He is feeling well and in his usual state of health today. He has no current illnesses, no other skin concerns. His allergies, medications, medical, and social history are reviewed by me today.      The patient's pertinent skin history includes : melanoma, multiple NMSC, and actinic keratoses  -BCC, right anterior shin, 5-FU, 12/13/17  -Recurrent BCC, right chest, Mohs, 12/20/17  -BCC, left shin, curettage, 04/05/17  -BCCs x3, right shoulder, [place], 10/05/16  -BCC, left lateral neck, Mohs, 05/31/16  -BCC, right posterior neck, cryotherapy, 03/29/16  -BCC, mid upper back, cryotherapy, 03/29/16  -BCC, central back superior, cryotherapy, 03/29/16  -BCC, central back inferior, cryotherapy, 03/29/16  -BCC, right mid back, cryotherapy, 03/29/16  -BCC, left shoulder, cryotherapy, 03/29/16  -Recurrent BCC, right lateral shoulder, Mohs, 11/23/15  -BCC, left neck, 5-FU, 08/18/15  -BCC, left anterior shoulder, 5-FU, 08/18/15  -BCC, right posterior shoulder, 5-FU, 08/18/15  -BCC, right periscapular area, 5-FU, 08/18/15  -BCCs x2, left mid back, 5-FU, 08/18/15  -Recurrent BCC, mid lower back, Mohs, 07/21/15  -Recurrent BCC, right anterior lower leg, curettage, 07/21/15  -Recurrent BCC, right anterior lower leg, curettage, 07/21/15  -BCC, left lateral neck, 5-FU, 05/21/15  -BCC, right arm, excision, 01/10/15  -MMi, chin, excision, 05/28/14  -MMi, back, excision, 12/12/13  -BCC, left shoulder, excision, 11/27/13  -BCC, right shoulder, excision, 11/27/13  -BCC, mid chest, excision, 11/27/13  -Hx multiple NMSC treated with excision and 5-FU prior to first visit    ROS: Constitutional: Negative. Dermatological : positive for - skin lesion changes    Social History     Socioeconomic History    Marital status:      Spouse name: Not on file    Number of children: Not on file    Years of education: Not on file    Highest education level: Not on file   Occupational History    Not on file   Social Needs    Financial resource strain: Not on file    Food insecurity:     Worry: Not on file     Inability: Not on file    Transportation needs:     Medical: Not on file     Non-medical: Not on file   Tobacco Use    Smoking status: Current Every Day Smoker     Packs/day: 0.10     Types: Cigarettes    Smokeless tobacco: Never Used    Tobacco comment: 4 cig per night   Substance and Sexual Activity    Alcohol use:  Yes     Alcohol/week: 8.0 oz     Types: 2 Glasses of wine, 14 Standard drinks or equivalent per week     Comment: glass of wine at dinner    Drug use: No    Sexual activity: Not on file   Lifestyle    Physical activity:     Days per week: Not on file     Minutes per session: Not on file    Stress: Not on file   Relationships    Social connections:     Talks on phone: Not on file     Gets together: Not on file     Attends Hinduism service: Not on file     Active member of club or organization: Not on file     Attends meetings of clubs or organizations: Not on file     Relationship status: Not on file    Intimate partner violence:     Fear of current or ex partner: Not on file     Emotionally abused: Not on file     Physically abused: Not on file     Forced sexual activity: Not on file   Other Topics Concern    Not on file   Social History Narrative    Not on file       Family History   Problem Relation Age of Onset    Cancer Father         melanoma    Hypertension Maternal Grandmother     Heart Disease Maternal Grandmother     Heart Disease Maternal Grandfather 80       Past Medical History:   Diagnosis Date    Basal cell carcinoma of back 11/2013    Dr. Huma Boothe. Dr. Edita Alcocer, 2000    x2    Dyslexia     Family history of skin cancer     Underwood's disease     Dr. Mona Rosales [id number]) 9/9/13    severe right-sided ha neck pain. CTA normal    Hypercholesteremia     start pravastatin 11/2013    Melanoma in situ ([address] 11/2013    Dr. Evelyne Mujica. excisions 12/2013, 5/2014    Neck pain on right side 9/9/2013    Prostate enlargement     1+ 6/3/15.  stable 12/2016    Skin cancer     multiple NMSC    Sun-damaged skin     Sunburn, blistering     Tubular adenoma of rectum 02/2017    repeat 5285    Umbilical hernia        Past Surgical History:   Procedure Laterality Date    HX COLONOSCOPY  02/20/2017    10 mm rectal tubular adenoma. repeat 2/2020 (3 years)    HX MOHS PROCEDURES  12/20/2017    BCC R chest by Dr. Paris Ada    toe tumor, left big toe as baby    HX OTHER SURGICAL  11/26/13    basal cell removed        Current Outpatient Medications   Medication Sig Dispense Refill    digestive 8/L.acidoph/pectin (DIGESTIVE ENZYME, ACIDOPH,PEC, PO) Take  by mouth.  pravastatin (PRAVACHOL) 20 mg tablet TAKE 1 TABLET BY MOUTH AT BEDTIME 30 Tab 4    TURMERIC, BULK, by Does Not Apply route.  co-enzyme Q-10 (CO Q-10) 100 mg capsule Take 1 Cap by mouth daily. 30 Cap 11    LACTOBACILLUS ACIDOPHILUS (PROBIOTIC PO) Take  by mouth.  senna-docusate (SENOKOT-S) 8.6-50 mg per tablet Take 1 Tab by mouth daily.  polyethylene glycol (MIRALAX) 17 gram/dose powder Take 17 g by mouth daily. 225 g 3    fluorouracil (EFUDEX) 5 % chemo cream Apply  to affected area two (2) times a day.  40 g 0       No Known Allergies      Objective:    Visit Vitals  /80 (BP 1 Location: Left arm, BP Patient Position: Sitting)   Pulse (!) 58   Temp 98 °F (36.7 °C) (Oral)   Resp 12   Ht 5' 11\" (1.803 m)   Wt 182 lb (82.6 kg)   SpO2 97%   BMI 25.38 kg/m²       Florentin Santos is a 70 y.o. male who appears well and in no distress. He is awake, alert, and oriented. There is no preauricular, submandibular, or cervical lymphadenopathy. A skin examination was performed including his scalp, face (including eyelids), ears, neck, chest, back, abdomen, upper extremities (including digits/nails), lower extremities, breasts, buttocks; genital skin was not examined. There are multiple well-healed scars without evidence of lesion recurrence. There are two actinic keratoses on the superior and mid helical rim. He has pink intradermal nevi and brown junctional nevi, no concerning features for severe atypia. There is a blue nevus on the left forearm. He has scattered waxy macules and keratotic papules consistent with seborrheic keratoses. He has scattered red papules consistent with cherry angiomas. There are lentigines on sun exposed areas. He has inflammatory, scaly macules and papules on the trunk consistent with [place]'s disease. There are two pink macules on the left shin and right shin consistent with superficial basal cell carcinoma. Assessment/Plan:    1. Personal history of nonmelanoma skin cancer. I discussed sun protection, sunscreen use, the warning signs of skin cancer, the need for self-skin examinations, and the need for regular practitioner exams every 6 months. The patient should follow up sooner as needed if new, changing, or symptomatic skin lesions arise. 2. Actinic Keratoses. The diagnosis of this precancerous lesion related to sun exposure was reviewed. He will treat the lesions on the left ear with 5-FU. 3. Normal nevi. The diagnosis of normal nevi was reviewed.   I discussed sun protection, sunscreen use, the warning signs of skin cancer, the need for self-skin examinations, and the need for regular practitioner exams every 6 months. The patient should follow up sooner as needed if new, changing, or symptomatic skin lesions arise. 4. Seborrheic keratoses. The diagnosis was reviewed and the patient was reassured that no treatment is needed for these benign lesions. 5. Cherry angiomas. The diagnosis was reviewed and the patient was reassured that no treatment is needed for these benign lesions. 6. Solar lentigos. The diagnosis and relationship to sun exposure was reviewed. Sun protection advised. 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 The patient was reassured that no treatment is necessary at this time. 8. Clincally diagnosed superficial BCCs, left shin and right shin. Pt will treat these with 5-FU BID for 4 weeks. Pt understands to call if lesions have not healed/ fail to resolve. This plan was reviewed with the patient and patient agrees. All questions were answered. This scribe documentation was reviewed by me and accurately reflects the examination and decisions made by me.

## 2019-06-27 ENCOUNTER — OFFICE VISIT (OUTPATIENT)
Dept: INTERNAL MEDICINE CLINIC | Age: 64
End: 2019-06-27

## 2019-06-27 ENCOUNTER — HOSPITAL ENCOUNTER (OUTPATIENT)
Dept: LAB | Age: 64
Discharge: HOME | End: 2019-06-27
Payer: MEDICARE

## 2019-06-27 VITALS
WEIGHT: 186 LBS | DIASTOLIC BLOOD PRESSURE: 82 MMHG | RESPIRATION RATE: 16 BRPM | TEMPERATURE: 99 F | OXYGEN SATURATION: 98 % | HEIGHT: 71 IN | BODY MASS INDEX: 26.04 KG/M2 | HEART RATE: 58 BPM | SYSTOLIC BLOOD PRESSURE: 130 MMHG

## 2019-06-27 DIAGNOSIS — Z12.5 PROSTATE CANCER SCREENING: ICD-10-CM

## 2019-06-27 DIAGNOSIS — E78.5 HYPERLIPIDEMIA, UNSPECIFIED HYPERLIPIDEMIA TYPE: ICD-10-CM

## 2019-06-27 DIAGNOSIS — Z72.0 TOBACCO ABUSE: ICD-10-CM

## 2019-06-27 DIAGNOSIS — R09.82 POST-NASAL DRIP: Primary | ICD-10-CM

## 2019-06-27 PROCEDURE — 80061 LIPID PANEL: CPT

## 2019-06-27 PROCEDURE — 36415 COLL VENOUS BLD VENIPUNCTURE: CPT

## 2019-06-27 PROCEDURE — 84153 ASSAY OF PSA TOTAL: CPT

## 2019-06-27 PROCEDURE — 80053 COMPREHEN METABOLIC PANEL: CPT

## 2019-06-27 RX ORDER — FLUTICASONE PROPIONATE 50 MCG
2 SPRAY, SUSPENSION (ML) NASAL DAILY
Qty: 1 BOTTLE | Refills: 0 | Status: SHIPPED | OUTPATIENT
Start: 2019-06-27 | End: 2021-04-01 | Stop reason: ALTCHOICE
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
## 2019-06-28 DIAGNOSIS — E78.5 HYPERLIPIDEMIA, UNSPECIFIED HYPERLIPIDEMIA TYPE: Primary | ICD-10-CM

## 2019-06-28 LAB
ALBUMIN SERPL-MCNC: 4.3 G/DL (ref 3.6–4.8)
ALBUMIN/GLOB SERPL: 2.4 {RATIO} (ref 1.2–2.2)
ALP SERPL-CCNC: 61 IU/L (ref 39–117)
ALT SERPL-CCNC: 11 IU/L (ref 0–44)
AST SERPL-CCNC: 15 IU/L (ref 0–40)
BILIRUB SERPL-MCNC: 0.5 MG/DL (ref 0–1.2)
BUN SERPL-MCNC: 14 MG/DL (ref 8–27)
BUN/CREAT SERPL: 11 (ref 10–24)
CALCIUM SERPL-MCNC: 9.2 MG/DL (ref 8.6–10.2)
CHLORIDE SERPL-SCNC: 102 MMOL/L (ref 96–106)
CHOLEST SERPL-MCNC: 211 MG/DL (ref 100–199)
CO2 SERPL-SCNC: 25 MMOL/L (ref 20–29)
CREAT SERPL-MCNC: 1.33 MG/DL (ref 0.76–1.27)
GLOBULIN SER CALC-MCNC: 1.8 G/DL (ref 1.5–4.5)
GLUCOSE SERPL-MCNC: 93 MG/DL (ref 65–99)
HDLC SERPL-MCNC: 49 MG/DL
INTERPRETATION, 910389: NORMAL
INTERPRETATION: NORMAL
LDLC SERPL CALC-MCNC: 143 MG/DL (ref 0–99)
PDF IMAGE, 910387: NORMAL
POTASSIUM SERPL-SCNC: 4.2 MMOL/L (ref 3.5–5.2)
PROT SERPL-MCNC: 6.1 G/DL (ref 6–8.5)
PSA SERPL-MCNC: 1.1 NG/ML (ref 0–4)
SODIUM SERPL-SCNC: 140 MMOL/L (ref 134–144)
TRIGL SERPL-MCNC: 95 MG/DL (ref 0–149)
VLDLC SERPL CALC-MCNC: 19 MG/DL (ref 5–40)

## 2019-06-28 RX ORDER — PRAVASTATIN SODIUM 20 MG/1
TABLET ORAL
Qty: 90 TAB | Refills: 1 | Status: SHIPPED | OUTPATIENT
Start: 2019-06-28 | End: 2021-04-01 | Stop reason: ALTCHOICE

## 2019-07-17 ENCOUNTER — OFFICE VISIT (OUTPATIENT)
Dept: INTERNAL MEDICINE CLINIC | Age: 64
End: 2019-07-17

## 2019-07-17 VITALS
WEIGHT: 186 LBS | BODY MASS INDEX: 26.04 KG/M2 | TEMPERATURE: 98.3 F | HEART RATE: 56 BPM | DIASTOLIC BLOOD PRESSURE: 84 MMHG | HEIGHT: 71 IN | OXYGEN SATURATION: 98 % | SYSTOLIC BLOOD PRESSURE: 122 MMHG | RESPIRATION RATE: 16 BRPM

## 2019-07-17 DIAGNOSIS — J02.9 SORE THROAT: Primary | ICD-10-CM

## 2019-07-17 DIAGNOSIS — J30.9 ALLERGIC RHINITIS, UNSPECIFIED SEASONALITY, UNSPECIFIED TRIGGER: ICD-10-CM

## 2019-07-17 RX ORDER — PREDNISONE 20 MG/1
TABLET ORAL
Qty: 12 TAB | Refills: 0 | Status: SHIPPED | OUTPATIENT
Start: 2019-07-17 | End: 2019-08-22 | Stop reason: ALTCHOICE

## 2019-07-17 NOTE — PROGRESS NOTES
Lorenzo Bowers is a 70 y.o. male who presents today for Sore Throat  . 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 Today patient is here for f/u. Upper respiratory illness:  Lorenzo Bowers presents with complaints of congestion, sore throat, post nasal drip and hoarseness for 4 weeks. no nausea and no vomiting . he has not had  dry cough, productive cough, fever and chills. Symptoms are moderate. Over-the-counter remedies including resumed allegra about 5 days ago. Resumed flonase. Drinking plenty of fluids: yes  Asthma?:  Never  Does smoke several cigarettes at night  Contacts with similar infections: no   Seen by me on 6/27, and this I would like allergies. We decided to place him on Flonase and see how he did. Since he notes great improvement but his symptoms worsened after cutting grass. He then asked the  with a use and he has been on Allegra since. Since being on Allegra today is the best day before his symptoms. .      ROS  Review of Systems   Constitutional: Negative for chills, fever and weight loss. HENT: Positive for sinus pain and sore throat. Negative for congestion, ear discharge, ear pain, hearing loss and tinnitus. Eyes: Negative for blurred vision, double vision and photophobia. Respiratory: Negative for cough, hemoptysis, shortness of breath and stridor. Cardiovascular: Negative for chest pain, palpitations and leg swelling. Gastrointestinal: Negative for abdominal pain, constipation, diarrhea, heartburn, nausea and vomiting. Genitourinary: Negative for dysuria, frequency and urgency.    Musculoskeletal: Negative for joint pain and myalgias. Skin: Negative for rash. Neurological: Negative. Negative for headaches. Endo/Heme/Allergies: Does not bruise/bleed easily. Psychiatric/Behavioral: Negative for memory loss and suicidal ideas. Visit Vitals  /84 (BP 1 Location: Left arm, BP Patient Position: Sitting)   Pulse (!) 56   Temp 98.3 °F (36.8 °C) (Oral)   Resp 16   Ht 5' 11\" (1.803 m)   Wt 186 lb (84.4 kg)   SpO2 98%   BMI 25.94 kg/m²       Physical Exam   Constitutional: He is oriented to person, place, and time. He appears well-developed and well-nourished. HENT:   Head: Normocephalic and atraumatic. Right Ear: External ear normal.   Left Ear: External ear normal.   Mildly irritated oropharynx. No lymphadenopathy noted   Eyes: Pupils are equal, round, and reactive to light. EOM are normal.   Neck: Normal range of motion. Neck supple. No JVD present. Cardiovascular: Normal rate and regular rhythm. No murmur heard. Pulmonary/Chest: Effort normal and breath sounds normal. No respiratory distress. He has no wheezes. No wheezing no egophony   Abdominal: Soft. Bowel sounds are normal. He exhibits no distension. There is no tenderness. Musculoskeletal: Normal range of motion. He exhibits no edema. Neurological: He is alert and oriented to person, place, and time. No cranial nerve deficit. Skin: Skin is warm and dry. He is not diaphoretic. Psychiatric: He has a normal mood and affect. His behavior is normal.         Current Outpatient Medications   Medication Sig    pravastatin (PRAVACHOL) 20 mg tablet TAKE 1 TABLET BY MOUTH AT BEDTIME    fluticasone propionate (FLONASE) 50 mcg/actuation nasal spray 2 Sprays by Both Nostrils route daily.  digestive 8/L.acidoph/pectin (DIGESTIVE ENZYME, ACIDOPH,PEC, PO) Take  by mouth.  TURMERIC, BULK, by Does Not Apply route.  co-enzyme Q-10 (CO Q-10) 100 mg capsule Take 1 Cap by mouth daily.  LACTOBACILLUS ACIDOPHILUS (PROBIOTIC PO) Take  by mouth.  senna-docusate (SENOKOT-S) 8.6-50 mg per tablet Take 1 Tab by mouth daily.  polyethylene glycol (MIRALAX) 17 gram/dose powder Take 17 g by mouth daily.  fluorouracil (EFUDEX) 5 % chemo cream Apply  to affected area two (2) times a day. No current facility-administered medications for this visit. Past Medical History:   Diagnosis Date    Basal cell carcinoma of back 11/2013    Dr. Ronda Alonzo. Dr. Elvie Walters, 2000    x2    Dyslexia     Family history of skin cancer     Cornel's disease     Dr. Michoacano Ramos [id number](920.4) 9/9/13    severe right-sided ha neck pain. CTA normal    Hypercholesteremia     start pravastatin 11/2013    Melanoma in situ ([facility] [address] 11/2013    Dr. Tejinder Carnes. excisions 12/2013, 5/2014    Neck pain on right side 9/9/2013    Prostate enlargement     1+ 6/3/15.  stable 12/2016    Skin cancer     multiple NMSC    Sun-damaged skin     Sunburn, blistering     Tubular adenoma of rectum 02/2017    repeat 4403    Umbilical hernia       Past Surgical History:   Procedure Laterality Date    HX COLONOSCOPY  02/20/2017    10 mm rectal tubular adenoma. repeat 2/2020 (3 years)    HX MOHS PROCEDURES  12/20/2017    BCC R chest by Dr. Camelia Newton    toe tumor, left big toe as baby    HX OTHER SURGICAL  11/26/13    basal cell removed       Social History     Tobacco Use    Smoking status: Current Every Day Smoker     Packs/day: 0.10     Types: Cigarettes    Smokeless tobacco: Never Used    Tobacco comment: 4 cig per night   Substance Use Topics    Alcohol use:  Yes     Alcohol/week: 13.3 standard drinks     Types: 2 Glasses of wine, 14 Standard drinks or equivalent per week     Comment: glass of wine at dinner      Family History   Problem Relation Age of Onset    Cancer Father         melanoma    Hypertension Maternal Grandmother     Heart Disease Maternal Grandmother     Heart Disease Maternal Grandfather 80        No Known Allergies     Assessment/Plan  Diagnoses and all orders for this visit:    1. Sore throat -likely seasonal allergies. We discussed continue Allegra and only if he is not better in a week or so to take a steroid taper. We discussed that if he takes his steroids and he continues to have sore throat given his smoking history it would be reasonable to see ENT to evaluate his throat for further  -     predniSONE (DELTASONE) 20 mg tablet; Take two tablets for 4 days then one for 4 days. 2. Allergic rhinitis, unspecified seasonality, unspecified trigger  -     predniSONE (DELTASONE) 20 mg tablet; Take two tablets for 4 days then one for 4 days. Florencio Corrales MD  7/17/2019    This note was created with the help of speech recognition software Mervin Solares) and may contain some 'sound alike' errors.

## 2019-07-17 NOTE — PATIENT INSTRUCTIONS
Allergies: Care Instructions  Your Care Instructions    Allergies occur when your body's defense system (immune system) overreacts to certain substances. The immune system treats a harmless substance as if it were a harmful germ or virus. Many things can cause this overreaction, including pollens, medicine, food, dust, animal dander, and mold. Allergies can be mild or severe. Mild allergies can be managed with home treatment. But medicine may be needed to prevent problems. Managing your allergies is an important part of staying healthy. Your doctor may suggest that you have allergy testing to help find out what is causing your allergies. When you know what things trigger your symptoms, you can avoid them. This can prevent allergy symptoms and other health problems. For severe allergies that cause reactions that affect your whole body (anaphylactic reactions), your doctor may prescribe a shot of epinephrine to carry with you in case you have a severe reaction. Learn how to give yourself the shot and keep it with you at all times. Make sure it is not . Follow-up care is a key part of your treatment and safety. Be sure to make and go to all appointments, and call your doctor if you are having problems. It's also a good idea to know your test results and keep a list of the medicines you take. How can you care for yourself at home? · If you have been told by your doctor that dust or dust mites are causing your allergy, decrease the dust around your bed:  ? Wash sheets, pillowcases, and other bedding in hot water every week. ? Use dust-proof covers for pillows, duvets, and mattresses. Avoid plastic covers because they tear easily and do not \"breathe. \" Wash as instructed on the label. ? Do not use any blankets and pillows that you do not need. ? Use blankets that you can wash in your washing machine. ? Consider removing drapes and carpets, which attract and hold dust, from your bedroom.   · If you are allergic to house dust and mites, do not use home humidifiers. Your doctor can suggest ways you can control dust and mites. · Look for signs of cockroaches. Cockroaches cause allergic reactions. Use cockroach baits to get rid of them. Then, clean your home well. Cockroaches like areas where grocery bags, newspapers, empty bottles, or cardboard boxes are stored. Do not keep these inside your home, and keep trash and food containers sealed. Seal off any spots where cockroaches might enter your home. · If you are allergic to mold, get rid of furniture, rugs, and drapes that smell musty. Check for mold in the bathroom. · If you are allergic to outdoor pollen or mold spores, use air-conditioning. Change or clean all filters every month. Keep windows closed. · If you are allergic to pollen, stay inside when pollen counts are high. Use a vacuum  with a HEPA filter or a double-thickness filter at least two times each week. · Stay inside when air pollution is bad. Avoid paint fumes, perfumes, and other strong odors. · Avoid conditions that make your allergies worse. Stay away from smoke. Do not smoke or let anyone else smoke in your house. Do not use fireplaces or wood-burning stoves. · If you are allergic to your pets, change the air filter in your furnace every month. Use high-efficiency filters. · If you are allergic to pet dander, keep pets outside or out of your bedroom. Old carpet and cloth furniture can hold a lot of animal dander. You may need to replace them. When should you call for help? Give an epinephrine shot if:    · You think you are having a severe allergic reaction.     · You have symptoms in more than one body area, such as mild nausea and an itchy mouth.    After giving an epinephrine shot call 911, even if you feel better.   Call 911 if:    · You have symptoms of a severe allergic reaction. These may include:  ? Sudden raised, red areas (hives) all over your body. ?  Swelling of the throat, mouth, lips, or tongue. ? Trouble breathing. ? Passing out (losing consciousness). Or you may feel very lightheaded or suddenly feel weak, confused, or restless.     · You have been given an epinephrine shot, even if you feel better.    Call your doctor now or seek immediate medical care if:    · You have symptoms of an allergic reaction, such as:  ? A rash or hives (raised, red areas on the skin). ? Itching. ? Swelling. ? Belly pain, nausea, or vomiting.    Watch closely for changes in your health, and be sure to contact your doctor if:    · You do not get better as expected. Where can you learn more? Go to http://harrison-vasquez.info/. Enter I803 in the search box to learn more about \"Allergies: Care Instructions. \"  Current as of: June 27, 2018  Content Version: 11.9  © 9057-9338 [employer]. Care instructions adapted under license by [employer] (which disclaims liability or warranty for this information). If you have questions about a medical condition or this instruction, always ask your healthcare professional. [address] any warranty or liability for your use of this information.

## 2019-08-22 ENCOUNTER — OFFICE VISIT (OUTPATIENT)
Dept: INTERNAL MEDICINE CLINIC | Age: 64
End: 2019-08-22

## 2019-08-22 VITALS
HEART RATE: 55 BPM | DIASTOLIC BLOOD PRESSURE: 83 MMHG | SYSTOLIC BLOOD PRESSURE: 127 MMHG | WEIGHT: 183.8 LBS | TEMPERATURE: 98.1 F | RESPIRATION RATE: 18 BRPM | HEIGHT: 71 IN | BODY MASS INDEX: 25.73 KG/M2 | OXYGEN SATURATION: 98 %

## 2019-08-22 DIAGNOSIS — Z12.5 PROSTATE CANCER SCREENING: ICD-10-CM

## 2019-08-22 DIAGNOSIS — E78.00 HYPERCHOLESTEREMIA: ICD-10-CM

## 2019-08-22 DIAGNOSIS — R07.89 ATYPICAL CHEST PAIN: ICD-10-CM

## 2019-08-22 DIAGNOSIS — A60.02 HERPES SIMPLEX INFECTION OF OTHER SITE OF MALE GENITAL ORGAN: ICD-10-CM

## 2019-08-22 DIAGNOSIS — N40.0 PROSTATE ENLARGEMENT: ICD-10-CM

## 2019-08-22 DIAGNOSIS — Z00.00 WELCOME TO MEDICARE PREVENTIVE VISIT: Primary | ICD-10-CM

## 2019-08-22 PROBLEM — Z86.006 HISTORY OF MELANOMA IN SITU: Status: ACTIVE | Noted: 2019-08-22

## 2019-08-22 PROBLEM — I45.10 RBBB (RIGHT BUNDLE BRANCH BLOCK): Status: ACTIVE | Noted: 2019-08-22

## 2019-08-22 RX ORDER — PNEUMOCOCCAL 13-VALENT CONJUGATE VACCINE 2.2; 2.2; 2.2; 2.2; 2.2; 4.4; 2.2; 2.2; 2.2; 2.2; 2.2; 2.2; 2.2 UG/.5ML; UG/.5ML; UG/.5ML; UG/.5ML; UG/.5ML; UG/.5ML; UG/.5ML; UG/.5ML; UG/.5ML; UG/.5ML; UG/.5ML; UG/.5ML; UG/.5ML
0.5 INJECTION, SUSPENSION INTRAMUSCULAR ONCE
Qty: 1 SYRINGE | Refills: 0 | Status: SHIPPED | OUTPATIENT
Start: 2019-08-22 | End: 2019-08-22 | Stop reason: ALTCHOICE

## 2019-08-22 RX ORDER — VALACYCLOVIR HYDROCHLORIDE 1 G/1
1000 TABLET, FILM COATED ORAL 2 TIMES DAILY
Qty: 28 TAB | Refills: 3 | Status: SHIPPED | OUTPATIENT
Start: 2019-08-22 | End: 2019-08-29

## 2019-08-22 RX ORDER — SILDENAFIL 100 MG/1
100 TABLET, FILM COATED ORAL
Qty: 30 TAB | Refills: 2 | Status: SHIPPED | OUTPATIENT
Start: 2019-08-22 | End: 2021-04-01

## 2019-08-22 NOTE — ACP (ADVANCE CARE PLANNING)
Advance Care Planning (ACP) Provider Note - Comprehensive     Date of ACP Conversation: 08/22/19  Persons included in Conversation:  patient  Length of ACP Conversation in minutes:  <16 minutes (Non-Billable)    Authorized Decision Maker (if patient is incapable of making informed decisions): This person is:  Healthcare Agent/Medical Power of  under Advance Directive          General ACP for ALL Patients with Decision Making Capacity:   Importance of advance care planning, including choosing a healthcare agent to communicate patient's healthcare decisions if patient lost the ability to make decisions, such as after a sudden illness or accident  Understanding of the healthcare agent role was assessed and information provided  Exploration of values, goals, and preferences if recovery is not expected, even with continued medical treatment in the event of: Imminent death  Severe, permanent brain injury    Review of Existing Advance Directive:  not availble     For Serious or Chronic Illness:  Understanding of medical condition    Understanding of CPR, goals and expected outcomes, benefits and burdens discussed. Information on CPR success rates provided (e.g. for CPR in hospital, survival to d/c at two weeks is 22%, for chronically ill or elderly/frail survival is less than 3%); Individual asked to communicate understanding of information in his/her own words.   Explored fears and concerns regarding CPR or possible outcomes    Interventions Provided:  Recommended completion of Advance Directive form after review of ACP materials and conversation with prospective healthcare agent   Recommended communicating the plan and making copies for the healthcare agent, personal physician, and others as appropriate (e.g., health system)

## 2019-08-23 NOTE — PROGRESS NOTES
This is a \"Welcome to [employer]"  Initial Preventive Physical Examination (IPPE) providing Personalized Prevention Plan Services (Performed in the first 12 months of enrollment)    I have reviewed the patient's medical history in detail and updated the computerized patient record. His girlfriend who is previously  recently found that she has herpes. He does have a mild outbreak. He continues to enjoy dating her enjoys driving his car. Hyperlipidemia  He resumed pravastatin after labs were done in June. ROS: taking medications as instructed, no medication side effects noted  No new myalgias, no joint pains, no weakness  No TIA's, no chest pain on exertion, no dyspnea on exertion, no swelling of ankles. Lab Results   Component Value Date/Time    Cholesterol, total 211 (H) 06/27/2019 12:21 PM    HDL Cholesterol 49 06/27/2019 12:21 PM    LDL, calculated 143 (H) 06/27/2019 12:21 PM    VLDL, calculated 19 06/27/2019 12:21 PM    Triglyceride 95 06/27/2019 12:21 PM       History     Past Medical History:   Diagnosis Date    Basal cell carcinoma of back 11/2013    Dr. Ela Baires. Dr. Justin Mcelroy, 2000    x2    Dyslexia     Family history of skin cancer     Cornel's disease     Dr. Aaron Granado [id number]) 9/9/13    severe right-sided ha neck pain. CTA normal    History of melanoma in situ 8/22/2019    Hypercholesteremia     start pravastatin 11/2013    Melanoma in situ ([address] 11/2013    Dr. Kassandra Turpin. excisions 12/2013, 5/2014    Neck pain on right side 9/9/2013    Prostate enlargement     1+ 6/3/15.  stable 12/2016    RBBB (right bundle branch block) 08/22/2019    Skin cancer     multiple NMSC    Sun-damaged skin     Sunburn, blistering     Tubular adenoma of rectum 02/2017    repeat 4015    Umbilical hernia       Past Surgical History:   Procedure Laterality Date    HX COLONOSCOPY  02/20/2017    10 mm rectal tubular adenoma.  repeat 2/2020 (3 years)    HX MOHS PROCEDURES  12/20/2017    BCC R chest by Dr. June Ramos    toe tumor, left big toe as baby    HX OTHER SURGICAL  11/26/13    basal cell removed      Current Outpatient Medications   Medication Sig Dispense Refill    valACYclovir (VALTREX) 1 gram tablet Take 1 Tab by mouth two (2) times a day for 7 days. As needed for outbreaks 28 Tab 3    sildenafil citrate (VIAGRA) 100 mg tablet Take 1 Tab by mouth daily as needed (ED). 30 Tab 2    pravastatin (PRAVACHOL) 20 mg tablet TAKE 1 TABLET BY MOUTH AT BEDTIME 90 Tab 1    fluticasone propionate (FLONASE) 50 mcg/actuation nasal spray 2 Sprays by Both Nostrils route daily. 1 Bottle 0    digestive 8/L.acidoph/pectin (DIGESTIVE ENZYME, ACIDOPH,PEC, PO) Take  by mouth.  TURMERIC, BULK, by Does Not Apply route.  co-enzyme Q-10 (CO Q-10) 100 mg capsule Take 1 Cap by mouth daily. 30 Cap 11     No Known Allergies  Family History   Problem Relation Age of Onset    Cancer Father         melanoma    Hypertension Maternal Grandmother     Heart Disease Maternal Grandmother     Heart Disease Maternal Grandfather 80     Social History     Tobacco Use    Smoking status: Current Every Day Smoker     Packs/day: 0.10     Types: Cigarettes    Smokeless tobacco: Never Used    Tobacco comment: 4 cig per night   Substance Use Topics    Alcohol use: Yes     Alcohol/week: 13.3 standard drinks     Types: 2 Glasses of wine, 14 Standard drinks or equivalent per week     Comment: glass of wine at dinner     Diet, Lifestyle: No special diet    Exercise level: moderately active    Depression Risk Screen     3 most recent PHQ Screens 8/22/2019   PHQ Not Done -   Little interest or pleasure in doing things Not at all   Feeling down, depressed, irritable, or hopeless Not at all   Total Score PHQ 2 0     Alcohol Risk Screen   You do not drink alcohol or very rarely. Functional Ability and Level of Safety   Hearing Loss  Hearing is good.      Vision Screening  Vision is good. No exam data present      Activities of Daily Living  The home contains: handrails  Patient does total self care    Fall Risk Screen  Fall Risk Assessment, last 12 mths 8/22/2019   Able to walk? Yes   Fall in past 12 months? No       Abuse Screen  Patient is not abused    Screening EKG   EKG order placed: Yes    Patient Care Team   Patient Care Team:  Wojciech Ureña MD as PCP - General (Internal Medicine)  Margie Young MD as Consulting Provider (Dermatology)     End of Life Planning   Advanced care planning directives were discussed with the patient and /or family/caregiver. Assessment/Plan   Education and counseling provided:  Are appropriate based on today's review and evaluation    Diagnoses and all orders for this visit:    1. Welcome to Medicare preventive visit  -     AMB POC EKG ROUTINE W/ 12 LEADS, INTER & REP    2. Hypercholesteremia  Resume statin recently. LDL was above goal.  He asked about cardiovascular risk. He has been having no obvious symptoms but has occasional twinges in his left pectoral region. Currently asymptomatic. He is active without issue. Will check coronary CT scan for risk assessment. Could consider stress test likely of low yield. -     CT HEART W/O CONT WITH CALCIUM; Future    3. Atypical chest pain atypical twinges. Monitor for now. Could consider stress test if recurs or any other concomitant symptoms. 4. 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 He is monogamous with his girlfriend who is also positive. This is his first outbreak.  -     valACYclovir (VALTREX) 1 gram tablet; Take 1 Tab by mouth two (2) times a day for 7 days. As needed for outbreaks    5. Prostate enlargement  6. Prostate cancer screening  Deferred exam today. Check PSA. ED  -     Try sildenafil citrate (VIAGRA) 100 mg tablet; Take 1 Tab by mouth daily as needed (ED).         Health Maintenance Due   Topic Date Due    GLAUCOMA SCREENING Q2Y  11/08/2018    COLONOSCOPY  02/14/2020

## 2019-08-23 NOTE — PATIENT INSTRUCTIONS
Medicare Wellness Visit, Male    The best way to live healthy is to have a lifestyle where you eat a well-balanced diet, exercise regularly, limit alcohol use, and quit all forms of tobacco/nicotine, if applicable. Regular preventive services are another way to keep healthy. Preventive services (vaccines, screening tests, monitoring & exams) can help personalize your care plan, which helps you manage your own care. Screening tests can find health problems at the earliest stages, when they are easiest to treat. [facility] follows the current, evidence-based guidelines published by the [facility] ([facility]) when recommending preventive services for our patients. Because we follow these guidelines, sometimes recommendations change over time as research supports it. (For example, a prostate screening blood test is no longer routinely recommended for men with no symptoms.)  Of course, you and your doctor may decide to screen more often for some diseases, based on your risk and co-morbidities (chronic disease you are already diagnosed with). Preventive services for you include:  - Medicare offers their members a free annual wellness visit, which is time for you and your primary care provider to discuss and plan for your preventive service needs. Take advantage of this benefit every year!  -All adults over age 72 should receive the recommended pneumonia vaccines. Current USPSTF guidelines recommend a series of two vaccines for the best pneumonia protection.   -All adults should have a flu vaccine yearly and an ECG.  All adults age 61 and older should receive a shingles vaccine once in their lifetime.    -All adults age 38-68 who are overweight should have a diabetes screening test once every three years.   -Other screening tests & preventive services for persons with diabetes include: an eye exam to screen for diabetic retinopathy, a kidney function test, a foot exam, and stricter control over your cholesterol.   -Cardiovascular screening for adults with routine risk involves an electrocardiogram (ECG) at intervals determined by the provider.   -Colorectal cancer screening should be done for adults age 54-65 with no increased risk factors for colorectal cancer. There are a number of acceptable methods of screening for this type of cancer. Each test has its own benefits and drawbacks. Discuss with your provider what is most appropriate for you during your annual wellness visit. The different tests include: colonoscopy (considered the best screening method), a fecal occult blood test, a fecal DNA test, and sigmoidoscopy.  -All adults born between [facility] should be screened once for Hepatitis C.  -An Abdominal Aortic Aneurysm (AAA) Screening is recommended for men age 73-68 who has ever smoked in their lifetime.      Here is a list of your current Health Maintenance items (your personalized list of preventive services) with a due date:  Health Maintenance Due   Topic Date Due    Glaucoma Screening   11/08/2018    Colonoscopy  02/14/2020

## 2019-09-03 ENCOUNTER — TELEPHONE (OUTPATIENT)
Dept: INTERNAL MEDICINE CLINIC | Age: 64
End: 2019-09-03

## 2019-09-03 DIAGNOSIS — R09.82 POST-NASAL DRIP: ICD-10-CM

## 2019-09-03 NOTE — TELEPHONE ENCOUNTER
Pt states he need a new script for the medication Dr. Jordan Stevenson gave him for his throat. He never got the original script filled and threw it away. Now throat is raw and he is requesting the script goes to [facility]. Pt does not remember the name of the medication. Thanks.

## 2019-09-04 NOTE — TELEPHONE ENCOUNTER
R/C to Pt and advised as per provider. Pt would like to f/u in office. Appt has been scheduled for 9/6/19.

## 2019-09-06 ENCOUNTER — OFFICE VISIT (OUTPATIENT)
Dept: INTERNAL MEDICINE CLINIC | Age: 64
End: 2019-09-06

## 2019-09-06 VITALS
DIASTOLIC BLOOD PRESSURE: 91 MMHG | TEMPERATURE: 98.1 F | HEART RATE: 57 BPM | OXYGEN SATURATION: 98 % | HEIGHT: 71 IN | BODY MASS INDEX: 25.9 KG/M2 | WEIGHT: 185 LBS | SYSTOLIC BLOOD PRESSURE: 133 MMHG

## 2019-09-06 DIAGNOSIS — R09.82 POST-NASAL DRIP: Primary | ICD-10-CM

## 2019-09-06 DIAGNOSIS — J30.9 ALLERGIC RHINITIS, UNSPECIFIED SEASONALITY, UNSPECIFIED TRIGGER: ICD-10-CM

## 2019-09-06 DIAGNOSIS — J02.9 SORE THROAT: ICD-10-CM

## 2019-09-06 RX ORDER — PREDNISONE 20 MG/1
TABLET ORAL
Qty: 12 TAB | Refills: 0 | Status: SHIPPED | OUTPATIENT
Start: 2019-09-06 | End: 2021-04-01 | Stop reason: ALTCHOICE

## 2019-09-06 NOTE — PROGRESS NOTES
Gabriella Gresham is a 70 y.o. male who presents today for Follow-up (throat problem, resumed flonase and allegra, feels somewhat better, but still hoarse)  . 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 Today patient is here for an acute visit. .     Recurrent Sore Throat: Patient has been seen by me several times due to this recurrent sore throat. This does seem to be triggered by environmental allergies. Patient does have a smoking history and does smoke nightly. In the past antihistamines and Flonase have helped his symptoms. Last visit in July we have decided to try a course of prednisone. He never did this as his symptoms improved. Stopped allegra and Flonase in mid august.   Since Monday having significant sore throat. Did mow the grass this last weekend. Since being back on the Flonase and allegra, this is much better. Denies any significant heartburn or GERD-like symptoms. ROS  Review of Systems   Constitutional: Negative for chills, fever and weight loss. HENT: Positive for congestion and sore throat. Negative for ear discharge, ear pain, hearing loss, sinus pain and tinnitus. Eyes: Negative for blurred vision, double vision and photophobia. Respiratory: Negative for cough, hemoptysis, sputum production, shortness of breath and stridor. Cardiovascular: Negative for chest pain, palpitations and leg swelling. Gastrointestinal: Negative for abdominal pain, constipation, diarrhea, heartburn, nausea and vomiting. Genitourinary: Negative for dysuria, frequency and urgency.    Musculoskeletal: Negative for joint pain and myalgias. Skin: Negative for rash. Neurological: Negative. Negative for headaches. Endo/Heme/Allergies: Does not bruise/bleed easily. Psychiatric/Behavioral: Negative for memory loss and suicidal ideas. Visit Vitals  /86 (BP 1 Location: Left arm, BP Patient Position: Sitting)   Pulse (!) 57   Temp 98.1 °F (36.7 °C) (Oral)   Ht 5' 11\" (1.803 m)   Wt 185 lb (83.9 kg)   SpO2 98%   BMI 25.80 kg/m²       Physical Exam   Constitutional: He is oriented to person, place, and time. He appears well-developed and well-nourished. HENT:   Head: Normocephalic and atraumatic. Throat clear no abnormalities no exudate. No cervical lymphadenopathy. Would behind both tympanic membranes. Eyes: Pupils are equal, round, and reactive to light. EOM are normal.   Cardiovascular: Normal rate and regular rhythm. No murmur heard. Pulmonary/Chest: Effort normal and breath sounds normal. No respiratory distress. All lung fields are clear. No wheezing no egophony   Musculoskeletal: Normal range of motion. He exhibits no edema. Neurological: He is alert and oriented to person, place, and time. Skin: Skin is warm and dry. He is not diaphoretic. Psychiatric: He has a normal mood and affect. His behavior is normal.         Current Outpatient Medications   Medication Sig    sildenafil citrate (VIAGRA) 100 mg tablet Take 1 Tab by mouth daily as needed (ED).  pravastatin (PRAVACHOL) 20 mg tablet TAKE 1 TABLET BY MOUTH AT BEDTIME    fluticasone propionate (FLONASE) 50 mcg/actuation nasal spray 2 Sprays by Both Nostrils route daily.  digestive 8/L.acidoph/pectin (DIGESTIVE ENZYME, ACIDOPH,PEC, PO) Take  by mouth.  TURMERIC, BULK, by Does Not Apply route.  co-enzyme Q-10 (CO Q-10) 100 mg capsule Take 1 Cap by mouth daily. No current facility-administered medications for this visit.          Past Medical History:   Diagnosis Date    Basal cell carcinoma of back 11/2013     Candy Billings.  Dr. Nunn Punch palsy 1968, 2000    x2    Dyslexia     Family history of skin cancer     Lublin's disease     Dr. Alma Delia HUANGBASPOH([id number]) 9/9/13    severe right-sided ha neck pain. CTA normal    History of melanoma in situ 8/22/2019    Hypercholesteremia     start pravastatin 11/2013    Melanoma in situ ([address] 11/2013    Dr. Marcial Burt. excisions 12/2013, 5/2014    Neck pain on right side 9/9/2013    Prostate enlargement     1+ 6/3/15.  stable 12/2016    RBBB (right bundle branch block) 08/22/2019    Skin cancer     multiple NMSC    Sun-damaged skin     Sunburn, blistering     Tubular adenoma of rectum 02/2017    repeat 1958    Umbilical hernia       Past Surgical History:   Procedure Laterality Date    HX COLONOSCOPY  02/20/2017    10 mm rectal tubular adenoma. repeat 2/2020 (3 years)    HX MOHS PROCEDURES  12/20/2017    BCC R chest by Dr. Stein Man    toe tumor, left big toe as baby    HX OTHER SURGICAL  11/26/13    basal cell removed       Social History     Tobacco Use    Smoking status: Current Every Day Smoker     Packs/day: 0.10     Types: Cigarettes    Smokeless tobacco: Never Used    Tobacco comment: 4 cig per night   Substance Use Topics    Alcohol use: Yes     Alcohol/week: 13.3 standard drinks     Types: 2 Glasses of wine, 14 Standard drinks or equivalent per week     Comment: glass of wine at dinner      Family History   Problem Relation Age of Onset    Cancer Father         melanoma    Hypertension Maternal Grandmother     Heart Disease Maternal Grandmother     Heart Disease Maternal Grandfather 80        No Known Allergies     Assessment/Plan  Diagnoses and all orders for this visit:    1. Post-nasal drip -sore throat and drainage most consistent with postnasal drip. Given patient's smoking history if this recurs I would suggest seeing ENT for their visualization of oropharynx.   I suggest that he continues both oral antihistamine and nasal steroids while he is still outdoors working and doing yard work. Will provide with the prednisone taper in case this becomes severe again it would urge to see ENT in that case. -     REFERRAL TO ENT-OTOLARYNGOLOGY    2. Allergic rhinitis, unspecified seasonality, unspecified trigger  -     REFERRAL TO ENT-OTOLARYNGOLOGY  -     predniSONE (DELTASONE) 20 mg tablet; Take two tablets for 4 days then one for 4 days. 3. Sore throat  -     REFERRAL TO ENT-OTOLARYNGOLOGY  -     predniSONE (DELTASONE) 20 mg tablet; Take two tablets for 4 days then one for 4 days. Georgia Siegel MD  9/6/2019    This note was created with the help of speech recognition software [employer]) and may contain some 'sound alike' errors.

## 2021-04-01 ENCOUNTER — OFFICE VISIT (OUTPATIENT)
Dept: INTERNAL MEDICINE CLINIC | Age: 66
End: 2021-04-01
Payer: MEDICARE

## 2021-04-01 VITALS
OXYGEN SATURATION: 98 % | DIASTOLIC BLOOD PRESSURE: 80 MMHG | SYSTOLIC BLOOD PRESSURE: 135 MMHG | HEART RATE: 62 BPM | RESPIRATION RATE: 13 BRPM | BODY MASS INDEX: 26.43 KG/M2 | HEIGHT: 71 IN | WEIGHT: 188.8 LBS | TEMPERATURE: 97.8 F

## 2021-04-01 DIAGNOSIS — E78.00 HYPERCHOLESTEREMIA: ICD-10-CM

## 2021-04-01 DIAGNOSIS — Z86.010 HISTORY OF COLON POLYPS: ICD-10-CM

## 2021-04-01 DIAGNOSIS — Z00.00 INITIAL MEDICARE ANNUAL WELLNESS VISIT: Primary | ICD-10-CM

## 2021-04-01 DIAGNOSIS — N40.0 PROSTATE ENLARGEMENT: ICD-10-CM

## 2021-04-01 DIAGNOSIS — N52.9 ERECTILE DYSFUNCTION, UNSPECIFIED ERECTILE DYSFUNCTION TYPE: ICD-10-CM

## 2021-04-01 PROCEDURE — G8510 SCR DEP NEG, NO PLAN REQD: HCPCS | Performed by: INTERNAL MEDICINE

## 2021-04-01 PROCEDURE — 3017F COLORECTAL CA SCREEN DOC REV: CPT | Performed by: INTERNAL MEDICINE

## 2021-04-01 PROCEDURE — G8536 NO DOC ELDER MAL SCRN: HCPCS | Performed by: INTERNAL MEDICINE

## 2021-04-01 PROCEDURE — G8427 DOCREV CUR MEDS BY ELIG CLIN: HCPCS | Performed by: INTERNAL MEDICINE

## 2021-04-01 PROCEDURE — G0438 PPPS, INITIAL VISIT: HCPCS | Performed by: INTERNAL MEDICINE

## 2021-04-01 PROCEDURE — G8419 CALC BMI OUT NRM PARAM NOF/U: HCPCS | Performed by: INTERNAL MEDICINE

## 2021-04-01 PROCEDURE — 1101F PT FALLS ASSESS-DOCD LE1/YR: CPT | Performed by: INTERNAL MEDICINE

## 2021-04-01 RX ORDER — ZOSTER VACCINE RECOMBINANT, ADJUVANTED 50 MCG/0.5
0.5 KIT INTRAMUSCULAR ONCE
Qty: 0.5 ML | Refills: 1
Start: 2021-04-01 | End: 2021-04-01 | Stop reason: ALTCHOICE

## 2021-04-01 RX ORDER — SILDENAFIL 50 MG/1
50 TABLET, FILM COATED ORAL
Qty: 30 TAB | Refills: 5 | Status: SHIPPED | OUTPATIENT
Start: 2021-04-01 | End: 2022-06-01 | Stop reason: SDUPTHER

## 2021-04-02 NOTE — PROGRESS NOTES
This is an Initial Medicare Annual Wellness Visit providing Personalized Prevention Plan Services (PPPS)  I have reviewed the patient's medical history in detail and updated the computerized patient record. He is doing well. Admits to a very healthy lifestyle with healthy eating, attributed to his girlfriends excellent dietary habits. Continues to enjoy driving his BMW. Hyperlipidemia  He is not taking pravastatin 20 mg. Under the impression that he was told he could stop it. No TIA's, no chest pain on exertion, no dyspnea on exertion, no swelling of ankles. Lab Results   Component Value Date/Time    Cholesterol, total 211 (H) 06/27/2019 12:21 PM    HDL Cholesterol 49 06/27/2019 12:21 PM    LDL, calculated 143 (H) 06/27/2019 12:21 PM    VLDL, calculated 19 06/27/2019 12:21 PM    Triglyceride 95 06/27/2019 12:21 PM           History     Past Medical History:   Diagnosis Date    Basal cell carcinoma of back 11/2013    Dr. Anne Marie Maurice. Dr. Shonda Ruiz, 2000    x2    Dyslexia     Family history of skin cancer     Cornel's disease     Dr. Ismael Jinchester [id number](336.3) 9/9/13    severe right-sided ha neck pain. CTA normal    History of melanoma in situ 8/22/2019    Hypercholesteremia     start pravastatin 11/2013    Melanoma in situ ([facility] [address] 11/2013    Dr. Avril Suggs. excisions 12/2013, 5/2014    Neck pain on right side 9/9/2013    Prostate enlargement     1+ 6/3/15.  stable 12/2016    RBBB (right bundle branch block) 08/22/2019    Skin cancer     multiple NMSC    Sun-damaged skin     Sunburn, blistering     Tubular adenoma of rectum 02/2017    repeat 1612    Umbilical hernia        Past Surgical History:   Procedure Laterality Date    HX COLONOSCOPY  02/20/2017    10 mm rectal tubular adenoma. Dr. Matthew Anderson.  repeat 2/2020 (3 years)    HX MOHS PROCEDURES  12/20/2017    BCC R chest by Dr. Renetta Gilliland    toe tumor, left big toe as baby    HX OTHER SURGICAL 11/26/13    basal cell removed        Current Outpatient Medications   Medication Sig    sildenafil citrate (VIAGRA) 50 mg tablet Take 1 Tab by mouth daily as needed (ED).  digestive 8/L.acidoph/pectin (DIGESTIVE ENZYME, ACIDOPH,PEC, PO) Take  by mouth.  co-enzyme Q-10 (CO Q-10) 100 mg capsule Take 1 Cap by mouth daily. No current facility-administered medications for this visit. No Known Allergies    Family History   Problem Relation Age of Onset    Cancer Father         melanoma    Hypertension Maternal Grandmother     Heart Disease Maternal Grandmother     Heart Disease Maternal Grandfather 80        reports that he has been smoking cigarettes. He has been smoking about 0.10 packs per day. He has never used smokeless tobacco.   reports current alcohol use of about 13.3 standard drinks of alcohol per week. Depression Risk Factor Screening:       Alcohol Risk Factor Screening: On any occasion during the past 3 months, have you had more than 3 drinks containing alcohol? No    Do you average more than 14 drinks per week? No      Functional Ability and Level of Safety:     Hearing Loss   mild    Activities of Daily Living   Self-care. Requires assistance with: no ADLs    Fall Risk     Fall Risk Assessment, last 12 mths 4/1/2021   Able to walk? Yes   Fall in past 12 months? 0   Do you feel unsteady? 0   Are you worried about falling 0         Abuse Screen   Patient is not abused    Review of Systems   A comprehensive review of systems was negative except for that written in the HPI. Physical Examination     Evaluation of Cognitive Function:  Mood/affect:  neutral, happy  Appearance: age appropriate  Family member/caregiver input: none    Blood pressure 135/80, pulse 62, temperature 97.8 °F (36.6 °C), temperature source Oral, resp. rate 13, height 5' 11\" (1.803 m), weight 188 lb 12.8 oz (85.6 kg), SpO2 98 %.   General appearance: alert, cooperative, no distress, appears stated age  Neck: supple, symmetrical, trachea midline, no adenopathy, thyroid: not enlarged, symmetric, no tenderness/mass/nodules, no carotid bruit and no JVD  Lungs: clear to auscultation bilaterally  Heart: regular rate and rhythm, S1, S2 normal, no murmur, click, rub or gallop  Extremities: extremities normal, atraumatic, no cyanosis or edema    Patient Care Team:  Medina Garibay MD as PCP - General (Internal Medicine)  Medina Garibay MD as PCP - [facility] Provider  Aubrey Lilly MD as Consulting Provider (Dermatology)      Advice/Referrals/Counseling   Education and counseling provided. See below for specific orders    Assessment/Plan   Diagnoses and all orders for this visit:    1. Initial Medicare annual wellness visit  He declines COVID-19, pneumonia and influenza vaccinations. Counseled. Recommend follow-up for colonoscopy. Screening labs as below. 2. History of colon polyps  He is overdue for 3-year colonoscopy since February 2020. Recommend follow-up with GI. He was understanding.  -     REFERRAL TO GASTROENTEROLOGY    3. Hypercholesteremia  The patient is asked to make an attempt to improve diet and exercise patterns to aid in medical management of this problem. He is not on statin therapy. I do not think he prefers to take it. -     LIPID PANEL; Future  -     CBC W/O DIFF; Future  -     METABOLIC PANEL, COMPREHENSIVE; Future    4. Prostate enlargement  Mild history of prostate enlargement. Check PSA. JESSICA not performed. -     PSA W/ REFLX FREE PSA; Future    5. Erectile dysfunction, unspecified erectile dysfunction type  Controlled with 50 mg sildenafil. Refilled. -     sildenafil citrate (VIAGRA) 50 mg tablet; Take 1 Tab by mouth daily as needed (ED). .    Potential medication side effects were discussed with the patient; let me know if any occur.   Return for yearly Annual Wellness Visits

## 2022-03-19 PROBLEM — D12.8 TUBULAR ADENOMA OF RECTUM: Status: ACTIVE | Noted: 2017-02-01

## 2022-03-19 PROBLEM — I45.10 RBBB (RIGHT BUNDLE BRANCH BLOCK): Status: ACTIVE | Noted: 2019-08-22

## 2022-03-20 PROBLEM — Z86.006 HISTORY OF MELANOMA IN SITU: Status: ACTIVE | Noted: 2019-08-22

## 2022-04-20 ENCOUNTER — TELEPHONE (OUTPATIENT)
Dept: INTERNAL MEDICINE CLINIC | Age: 67
End: 2022-04-20

## 2022-05-19 ENCOUNTER — NURSE TRIAGE (OUTPATIENT)
Dept: OTHER | Facility: CLINIC | Age: 67
End: 2022-05-19

## 2022-05-19 NOTE — TELEPHONE ENCOUNTER
Received call from Gus Daniel at [facility] with [employer] Complaint. Subjective: Caller states \"swelling in right leg that comes and goes. Swelling calf to ankle. \"     Current Symptoms: right leg swelling calf to ankle/. Onset: 1 month ago; sudden    Associated Symptoms: NA    Pain Severity: 0/10; n/a; n/a    Temperature: patient denies by unknown method    What has been tried: increased fluid intake and walking more    LMP: NA Pregnant: NA    Recommended disposition: Go to ED/UCC Now (Or to Office with PCP Approval)    Care advice provided, patient verbalizes understanding; denies any other questions or concerns; instructed to call back for any new or worsening symptoms. Writer provided warm transfer to [employer] [facility]  for Second level triage for right calf swelling    Attention Provider: Thank you for allowing me to participate in the care of your patient. The patient was connected to triage in response to information provided to the ECC. Please do not respond through this encounter as the response is not directed to a shared pool.       Reason for Disposition   Thigh, calf, or ankle swelling in only one leg    Protocols used: LEG SWELLING AND EDEMA-ADULT-OH

## 2022-06-01 ENCOUNTER — OFFICE VISIT (OUTPATIENT)
Dept: INTERNAL MEDICINE CLINIC | Age: 67
End: 2022-06-01
Payer: MEDICARE

## 2022-06-01 VITALS
WEIGHT: 182 LBS | SYSTOLIC BLOOD PRESSURE: 113 MMHG | HEIGHT: 71 IN | DIASTOLIC BLOOD PRESSURE: 69 MMHG | RESPIRATION RATE: 16 BRPM | HEART RATE: 61 BPM | BODY MASS INDEX: 25.48 KG/M2 | OXYGEN SATURATION: 97 % | TEMPERATURE: 98.3 F

## 2022-06-01 DIAGNOSIS — Z28.21 IMMUNIZATION DECLINED: ICD-10-CM

## 2022-06-01 DIAGNOSIS — Z00.00 MEDICARE ANNUAL WELLNESS VISIT, SUBSEQUENT: Primary | ICD-10-CM

## 2022-06-01 DIAGNOSIS — Z86.006 HISTORY OF MELANOMA IN SITU: ICD-10-CM

## 2022-06-01 DIAGNOSIS — Z86.010 HISTORY OF COLON POLYPS: ICD-10-CM

## 2022-06-01 DIAGNOSIS — N52.9 ERECTILE DYSFUNCTION, UNSPECIFIED ERECTILE DYSFUNCTION TYPE: ICD-10-CM

## 2022-06-01 DIAGNOSIS — N40.0 PROSTATE ENLARGEMENT: ICD-10-CM

## 2022-06-01 DIAGNOSIS — R22.41 LOCALIZED SWELLING OF RIGHT LOWER LEG: ICD-10-CM

## 2022-06-01 DIAGNOSIS — E78.00 HYPERCHOLESTEREMIA: ICD-10-CM

## 2022-06-01 LAB
ALBUMIN SERPL-MCNC: 3.9 G/DL (ref 3.5–5)
ALBUMIN/GLOB SERPL: 1.6 {RATIO} (ref 1.1–2.2)
ALP SERPL-CCNC: 65 U/L (ref 45–117)
ALT SERPL-CCNC: 20 U/L (ref 12–78)
ANION GAP SERPL CALC-SCNC: 3 MMOL/L (ref 5–15)
AST SERPL-CCNC: 20 U/L (ref 15–37)
BILIRUB SERPL-MCNC: 0.4 MG/DL (ref 0.2–1)
BUN SERPL-MCNC: 15 MG/DL (ref 6–20)
BUN/CREAT SERPL: 11 (ref 12–20)
CALCIUM SERPL-MCNC: 9.1 MG/DL (ref 8.5–10.1)
CHLORIDE SERPL-SCNC: 107 MMOL/L (ref 97–108)
CHOLEST SERPL-MCNC: 194 MG/DL
CO2 SERPL-SCNC: 30 MMOL/L (ref 21–32)
CREAT SERPL-MCNC: 1.37 MG/DL (ref 0.7–1.3)
ERYTHROCYTE [DISTWIDTH] IN BLOOD BY AUTOMATED COUNT: 11.7 % (ref 11.5–14.5)
GLOBULIN SER CALC-MCNC: 2.5 G/DL (ref 2–4)
GLUCOSE SERPL-MCNC: 93 MG/DL (ref 65–100)
HCT VFR BLD AUTO: 43 % (ref 36.6–50.3)
HDLC SERPL-MCNC: 50 MG/DL
HDLC SERPL: 3.9 {RATIO} (ref 0–5)
HGB BLD-MCNC: 14.6 G/DL (ref 12.1–17)
LDLC SERPL CALC-MCNC: 113 MG/DL (ref 0–100)
MCH RBC QN AUTO: 34.1 PG (ref 26–34)
MCHC RBC AUTO-ENTMCNC: 34 G/DL (ref 30–36.5)
MCV RBC AUTO: 100.5 FL (ref 80–99)
NRBC # BLD: 0 K/UL (ref 0–0.01)
NRBC BLD-RTO: 0 PER 100 WBC
PLATELET # BLD AUTO: 229 K/UL (ref 150–400)
PMV BLD AUTO: 10.4 FL (ref 8.9–12.9)
POTASSIUM SERPL-SCNC: 4.3 MMOL/L (ref 3.5–5.1)
PROT SERPL-MCNC: 6.4 G/DL (ref 6.4–8.2)
RBC # BLD AUTO: 4.28 M/UL (ref 4.1–5.7)
SODIUM SERPL-SCNC: 140 MMOL/L (ref 136–145)
TRIGL SERPL-MCNC: 155 MG/DL (ref ?–150)
TSH SERPL DL<=0.05 MIU/L-ACNC: 1.88 UIU/ML (ref 0.36–3.74)
VLDLC SERPL CALC-MCNC: 31 MG/DL
WBC # BLD AUTO: 4.4 K/UL (ref 4.1–11.1)

## 2022-06-01 PROCEDURE — G0439 PPPS, SUBSEQ VISIT: HCPCS | Performed by: INTERNAL MEDICINE

## 2022-06-01 PROCEDURE — 1101F PT FALLS ASSESS-DOCD LE1/YR: CPT | Performed by: INTERNAL MEDICINE

## 2022-06-01 PROCEDURE — G8417 CALC BMI ABV UP PARAM F/U: HCPCS | Performed by: INTERNAL MEDICINE

## 2022-06-01 PROCEDURE — G8510 SCR DEP NEG, NO PLAN REQD: HCPCS | Performed by: INTERNAL MEDICINE

## 2022-06-01 PROCEDURE — G8536 NO DOC ELDER MAL SCRN: HCPCS | Performed by: INTERNAL MEDICINE

## 2022-06-01 PROCEDURE — G8427 DOCREV CUR MEDS BY ELIG CLIN: HCPCS | Performed by: INTERNAL MEDICINE

## 2022-06-01 PROCEDURE — 3017F COLORECTAL CA SCREEN DOC REV: CPT | Performed by: INTERNAL MEDICINE

## 2022-06-01 PROCEDURE — G0463 HOSPITAL OUTPT CLINIC VISIT: HCPCS | Performed by: INTERNAL MEDICINE

## 2022-06-01 PROCEDURE — 99214 OFFICE O/P EST MOD 30 MIN: CPT | Performed by: INTERNAL MEDICINE

## 2022-06-01 RX ORDER — SILDENAFIL 50 MG/1
50 TABLET, FILM COATED ORAL
Qty: 30 TABLET | Refills: 5 | Status: SHIPPED | OUTPATIENT
Start: 2022-06-01

## 2022-06-01 NOTE — PROGRESS NOTES
This is a Subsequent Medicare Annual Wellness Visit providing Personalized Prevention Plan Services (PPPS) (Performed 12 months after initial AWV and PPPS )    I have reviewed the patient's medical history in detail and updated the computerized patient record. Right medial lower leg and ankle edema intermittently for 6 weeks. Worsens later in the day. Not painful. Denies injury, weakness. Foot is not swollen. No pain. Mild overall. Had insect bite on R lateral ankle jjust prior to this. Seeing Dr. Elvie Barcenas for skin cancer f/u. No recent visits. Using FU on skin lessions of left deltoid. Dx 3 days of covid 19 s/s 1/2022. Doing well. Declines all vaccines. Hyperlipidemia  Not taking pravastatin  No new myalgias, no joint pains, no weakness  No TIA's, no chest pain on exertion, no dyspnea on exertion, no swelling of ankles. Lab Results   Component Value Date/Time    Cholesterol, total 211 (H) 06/27/2019 12:21 PM    HDL Cholesterol 49 06/27/2019 12:21 PM    LDL, calculated 143 (H) 06/27/2019 12:21 PM    VLDL, calculated 19 06/27/2019 12:21 PM    Triglyceride 95 06/27/2019 12:21 PM         History     Past Medical History:   Diagnosis Date    Basal cell carcinoma of back 11/2013    Dr. Elvie Barcenas. Dr. Oskar Wise, 2000    x2    COVID-19 vaccine series declined     Dyslexia     Family history of skin cancer     Cornel's disease     Dr. Angelina Walls [id number](536.6) 9/9/13    severe right-sided ha neck pain. CTA normal    History of melanoma in situ 8/22/2019    Hypercholesteremia     start pravastatin 11/2013    Immunization declined     declines pneumococal, flu, covid, shingrix vaccines    Melanoma in situ ([address] 11/2013    Dr. Jamison [facility].   excisions 12/2013, 5/2014    Neck pain on right side 9/9/2013    Prostate enlargement     1+ 6/3/15.  stable 12/2016    RBBB (right bundle branch block) 08/22/2019    Skin cancer     multiple NMSC    Sun-damaged skin     Sunburn, blistering     Tubular adenoma of rectum 92/6543    Umbilical hernia        Past Surgical History:   Procedure Laterality Date    HX COLONOSCOPY  02/20/2017    10 mm rectal tubular adenoma. Dr. Bee Knapp. repeat 2/2020 (3 years)    HX MOHS PROCEDURES  12/20/2017    BCC R chest by Dr. Romero Sole    toe tumor, left big toe as baby    HX OTHER SURGICAL  11/26/13    basal cell removed        Current Outpatient Medications   Medication Sig    sildenafil citrate (VIAGRA) 50 mg tablet Take 1 Tab by mouth daily as needed (ED).  digestive 8/L.acidoph/pectin (DIGESTIVE ENZYME, ACIDOPH,PEC, PO) Take  by mouth.  co-enzyme Q-10 (CO Q-10) 100 mg capsule Take 1 Cap by mouth daily. No current facility-administered medications for this visit. No Known Allergies    Family History   Problem Relation Age of Onset    Cancer Father         melanoma    Hypertension Maternal Grandmother     Heart Disease Maternal Grandmother     Heart Disease Maternal Grandfather 80        reports that he has been smoking cigarettes. He has been smoking about 0.10 packs per day. He has never used smokeless tobacco.   reports current alcohol use of about 13.3 standard drinks of alcohol per week. Depression Risk Factor Screening:       Alcohol Risk Factor Screening: On any occasion during the past 3 months, have you had more than 3 drinks containing alcohol? No    Do you average more than 14 drinks per week? No      Functional Ability and Level of Safety:     Hearing Loss   mild    Activities of Daily Living   Self-care. Requires assistance with: no ADLs    Fall Risk     Fall Risk Assessment, last 12 mths 6/1/2022   Able to walk? Yes   Fall in past 12 months? 0   Do you feel unsteady? 0   Are you worried about falling 0         Abuse Screen   Patient is not abused    Review of Systems   A comprehensive review of systems was negative except for that written in the HPI.     Physical Examination Evaluation of Cognitive Function:  Mood/affect:  neutral, happy  Appearance: age appropriate  Family member/caregiver input: none    Blood pressure 113/69, pulse 61, temperature 98.3 °F (36.8 °C), temperature source Oral, resp. rate 16, height 5' 11\" (1.803 m), weight 182 lb (82.6 kg), SpO2 97 %. General appearance: alert, cooperative, no distress, appears stated age  Neck: supple, symmetrical, trachea midline, no adenopathy, thyroid: not enlarged, symmetric, no tenderness/mass/nodules, no carotid bruit and no JVD  Lungs: clear to auscultation bilaterally  Heart: regular rate and rhythm, S1, S2 normal, no murmur, click, rub or gallop  Extremities: extremities normal, atraumatic, no cyanosis  Right ankle trace edema, non-pitting. Non-tender. Patient Care Team:  Willis Durham MD as PCP - General (Internal Medicine Physician)  Willis Durham MD as PCP - [facility]  Tien Barbosa MD as Consulting Provider (Dermatology Physician)      Advice/Referrals/Counseling   Education and counseling provided. See below for specific orders    Assessment/Plan       Diagnoses and all orders for this visit:    1. Medicare annual wellness visit, subsequent  ACP reviewed. Primary medical decision maker reviewed with patient and updated in chart. he is otherwise up-to-date or have declined preventative services except for those ordered below. 2. History of colon polyps- overdue for 3 year f/u by 2 years. Strongly encouraged colononoscopy  -     REFERRAL TO GASTROENTEROLOGY    3. Immunizations declined    4. Localized swelling of right lower leg  Venous insufficiency  Unlikely DVT  try compression stocking  Can refer to vascular prn  -     TSH 3RD GENERATION; Future  -     CBC W/O DIFF; Future    5. Hypercholesteremia\  The patient is asked to make an attempt to improve diet and exercise patterns to aid in medical management of this problem  -     TSH 3RD GENERATION; Future  -     LIPID PANEL;  Future  - METABOLIC PANEL, COMPREHENSIVE; Future    6. Prostate enlargement  Currently asymptomatic  -     PSA W/ REFLX FREE PSA; Future    7. History of melanoma in situ  Hx BCC  He wants to see Real Perez again  Marcial Fraser, [address] [facility], [address] [facility], [address]  [phone]  [facility])    [address] [facility]  [address] [place] [address] [address]  [facility], [address]  [phone]  (Fridays only)    \    Potential medication side effects were discussed with the patient; let me know if any occur.   Return for yearly Annual Wellness Visits

## 2022-06-01 NOTE — PATIENT INSTRUCTIONS
Gilbert Ontiveros, SCOTTIE Vallada[facility]  [facility]    [facility]  [address] [facility], [address] [facility], [address]  [phone]  [facility])    [address]  [address] [facility] [address]  [place], [address]  [phone]  (Fridays only)

## 2022-06-02 LAB
PSA SERPL-MCNC: 1.3 NG/ML (ref 0–4)
REFLEX CRITERIA: NORMAL

## 2023-05-20 RX ORDER — UBIDECARENONE 100 MG
100 CAPSULE ORAL DAILY
COMMUNITY
Start: 2016-12-14

## 2023-05-20 RX ORDER — SILDENAFIL 50 MG/1
50 TABLET, FILM COATED ORAL DAILY PRN
COMMUNITY
Start: 2022-06-01

## 2023-08-09 ENCOUNTER — TELEPHONE (OUTPATIENT)
Age: 68
End: 2023-08-09

## 2023-08-09 NOTE — TELEPHONE ENCOUNTER
PSR attempted to reach out to patient to schedule Medicare Wellness - no answer, left detailed VM for call back     If patient returns call PLEASE ASSIST IN [address] with provider

## 2023-08-09 NOTE — TELEPHONE ENCOUNTER
----- Message from Zoraida Bellamyerin sent at 8/8/2023  4:01 PM EDT -----  Subject: Appointment Request    Reason for Call: Established Patient Appointment needed: Routine Medicare   AWV    QUESTIONS    Reason for appointment request? Available appointments did not meet   patient need     Additional Information for Provider?  Pt need a call back for his awv   visit.   ---------------------------------------------------------------------------  --------------  Benita Rodriguez [facility]  [phone]; OK to leave message on voicemail  ---------------------------------------------------------------------------  --------------  SCRIPT ANSWERS

## 2023-09-01 ENCOUNTER — OFFICE VISIT (OUTPATIENT)
Age: 68
End: 2023-09-01
Payer: MEDICARE

## 2023-09-01 VITALS
TEMPERATURE: 97.4 F | SYSTOLIC BLOOD PRESSURE: 142 MMHG | WEIGHT: 183.2 LBS | HEIGHT: 71 IN | RESPIRATION RATE: 16 BRPM | BODY MASS INDEX: 25.65 KG/M2 | DIASTOLIC BLOOD PRESSURE: 80 MMHG | HEART RATE: 56 BPM | OXYGEN SATURATION: 97 %

## 2023-09-01 DIAGNOSIS — Z00.00 MEDICARE ANNUAL WELLNESS VISIT, SUBSEQUENT: Primary | ICD-10-CM

## 2023-09-01 DIAGNOSIS — Z13.6 SCREENING FOR CARDIOVASCULAR CONDITION: ICD-10-CM

## 2023-09-01 DIAGNOSIS — Z12.11 ENCOUNTER FOR SCREENING COLONOSCOPY: ICD-10-CM

## 2023-09-01 DIAGNOSIS — N52.9 ERECTILE DYSFUNCTION, UNSPECIFIED ERECTILE DYSFUNCTION TYPE: ICD-10-CM

## 2023-09-01 DIAGNOSIS — I45.10 RBBB (RIGHT BUNDLE BRANCH BLOCK): ICD-10-CM

## 2023-09-01 DIAGNOSIS — D12.8 TUBULAR ADENOMA OF RECTUM: ICD-10-CM

## 2023-09-01 DIAGNOSIS — F17.200 SMOKER: ICD-10-CM

## 2023-09-01 DIAGNOSIS — Z13.6 ENCOUNTER FOR ABDOMINAL AORTIC ANEURYSM (AAA) SCREENING: ICD-10-CM

## 2023-09-01 DIAGNOSIS — Z12.5 SCREENING FOR MALIGNANT NEOPLASM OF PROSTATE: ICD-10-CM

## 2023-09-01 PROCEDURE — 93005 ELECTROCARDIOGRAM TRACING: CPT | Performed by: NURSE PRACTITIONER

## 2023-09-01 PROCEDURE — 93010 ELECTROCARDIOGRAM REPORT: CPT | Performed by: NURSE PRACTITIONER

## 2023-09-01 PROCEDURE — 1123F ACP DISCUSS/DSCN MKR DOCD: CPT | Performed by: NURSE PRACTITIONER

## 2023-09-01 PROCEDURE — G0439 PPPS, SUBSEQ VISIT: HCPCS | Performed by: NURSE PRACTITIONER

## 2023-09-01 RX ORDER — SILDENAFIL 50 MG/1
50 TABLET, FILM COATED ORAL DAILY PRN
Qty: 60 TABLET | Refills: 0 | Status: SHIPPED | OUTPATIENT
Start: 2023-09-01 | End: 2023-10-31

## 2023-09-01 SDOH — ECONOMIC STABILITY: FOOD INSECURITY: WITHIN THE PAST 12 MONTHS, THE FOOD YOU BOUGHT JUST DIDN'T LAST AND YOU DIDN'T HAVE MONEY TO GET MORE.: NEVER TRUE

## 2023-09-01 SDOH — ECONOMIC STABILITY: HOUSING INSECURITY
IN THE LAST 12 MONTHS, WAS THERE A TIME WHEN YOU DID NOT HAVE A STEADY PLACE TO SLEEP OR SLEPT IN A SHELTER (INCLUDING NOW)?: NO

## 2023-09-01 SDOH — ECONOMIC STABILITY: INCOME INSECURITY: HOW HARD IS IT FOR YOU TO PAY FOR THE VERY BASICS LIKE FOOD, HOUSING, MEDICAL CARE, AND HEATING?: NOT HARD AT ALL

## 2023-09-01 SDOH — ECONOMIC STABILITY: FOOD INSECURITY: WITHIN THE PAST 12 MONTHS, YOU WORRIED THAT YOUR FOOD WOULD RUN OUT BEFORE YOU GOT MONEY TO BUY MORE.: NEVER TRUE

## 2023-09-01 ASSESSMENT — PATIENT HEALTH QUESTIONNAIRE - PHQ9
SUM OF ALL RESPONSES TO PHQ QUESTIONS 1-9: 0
SUM OF ALL RESPONSES TO PHQ9 QUESTIONS 1 & 2: 0
2. FEELING DOWN, DEPRESSED OR HOPELESS: 0
SUM OF ALL RESPONSES TO PHQ QUESTIONS 1-9: 0
1. LITTLE INTEREST OR PLEASURE IN DOING THINGS: 0

## 2023-09-01 NOTE — PROGRESS NOTES
Medicare Annual Wellness Visit    Jayro Marcial is here for Medicare AWV    Assessment & Plan   Medicare annual wellness visit, subsequent:  Preventative care such as prostate and colon CA screening discussed. Recommended to have annual eye exam  Will continue to follow up with dermatologist every 6 months. RBBB (right bundle branch block): EKG comparable with previous. Fully asymptomatic.   -     AMB POC EKG ROUTINE  Smoker: Not ready to quit   -     Lipid Panel; Future  -     CBC with Auto Differential; Future  Tubular adenoma of rectum: Colonoscopy 5 years ago, referral to GI for repeat colonoscopy given. Fully asymptomatic. Erectile dysfunction, unspecified erectile dysfunction type: Takes Sildenafil as needed, refill sent to pharmacy. -     sildenafil (VIAGRA) 50 MG tablet; Take 1 tablet by mouth daily as needed for Erectile Dysfunction, Disp-60 tablet, R-0Normal  -     CBC with Auto Differential; Future  -     Comprehensive Metabolic Panel; Future  Encounter for abdominal aortic aneurysm (AAA) screening  -     US ABDOMINAL AORTA LIMITED; Future  Encounter for screening colonoscopy  -     [facility] Mcintosh [facility])  Screening for malignant neoplasm of prostate  -     PSA Screening; Future  Screening for cardiovascular condition  -     Lipid Panel; Future    Recommendations for Preventive Services Due: see orders and patient instructions/AVS.  Recommended screening schedule for the next 5-10 years is provided to the patient in written form: see Patient Instructions/AVS.     Return in 1 year (on 9/1/2024). Subjective   The following acute and/or chronic problems were also addressed today:  Mr. Cindi Tyson presents today for annual wellness visit. He is retired and lives with girlfriends. Admits healthy eating with a balanced diet. Currently does not exercise but stays active around the house. He is an every day smoker. Takes Sildenafil as needed.  Denies urinary

## 2024-11-08 ENCOUNTER — TELEPHONE (OUTPATIENT)
Age: 69
End: 2024-11-08

## 2024-11-08 ENCOUNTER — HOSPITAL ENCOUNTER (EMERGENCY)
Facility: HOSPITAL | Age: 69
Discharge: HOME | End: 2024-11-08
Attending: STUDENT IN AN ORGANIZED HEALTH CARE EDUCATION/TRAINING PROGRAM
Payer: MEDICARE

## 2024-11-08 ENCOUNTER — APPOINTMENT (OUTPATIENT)
Facility: HOSPITAL | Age: 69
End: 2024-11-08
Payer: MEDICARE

## 2024-11-08 VITALS
DIASTOLIC BLOOD PRESSURE: 75 MMHG | BODY MASS INDEX: 25.62 KG/M2 | TEMPERATURE: 98.6 F | HEIGHT: 71 IN | OXYGEN SATURATION: 98 % | HEART RATE: 56 BPM | RESPIRATION RATE: 14 BRPM | WEIGHT: 182.98 LBS | SYSTOLIC BLOOD PRESSURE: 134 MMHG

## 2024-11-08 DIAGNOSIS — R00.2 PALPITATIONS: Primary | ICD-10-CM

## 2024-11-08 LAB
ALBUMIN SERPL-MCNC: 3.8 G/DL (ref 3.5–5)
ALBUMIN/GLOB SERPL: 1.3 (ref 1.1–2.2)
ALP SERPL-CCNC: 59 U/L (ref 45–117)
ALT SERPL-CCNC: 21 U/L (ref 12–78)
ANION GAP SERPL CALC-SCNC: 6 MMOL/L (ref 2–12)
AST SERPL-CCNC: 16 U/L (ref 15–37)
BASOPHILS # BLD: 0.1 K/UL (ref 0–0.1)
BASOPHILS NFR BLD: 1 % (ref 0–1)
BILIRUB SERPL-MCNC: 0.5 MG/DL (ref 0.2–1)
BUN SERPL-MCNC: 13 MG/DL (ref 6–20)
BUN/CREAT SERPL: 10 (ref 12–20)
CALCIUM SERPL-MCNC: 9.2 MG/DL (ref 8.5–10.1)
CHLORIDE SERPL-SCNC: 106 MMOL/L (ref 97–108)
CO2 SERPL-SCNC: 31 MMOL/L (ref 21–32)
CREAT SERPL-MCNC: 1.36 MG/DL (ref 0.7–1.3)
D DIMER PPP FEU-MCNC: 0.32 MG/L FEU (ref 0–0.65)
DIFFERENTIAL METHOD BLD: NORMAL
EOSINOPHIL # BLD: 0.2 K/UL (ref 0–0.4)
EOSINOPHIL NFR BLD: 3 % (ref 0–7)
ERYTHROCYTE [DISTWIDTH] IN BLOOD BY AUTOMATED COUNT: 11.8 % (ref 11.5–14.5)
GLOBULIN SER CALC-MCNC: 2.9 G/DL (ref 2–4)
GLUCOSE SERPL-MCNC: 96 MG/DL (ref 65–100)
HCT VFR BLD AUTO: 42.3 % (ref 36.6–50.3)
HGB BLD-MCNC: 14.8 G/DL (ref 12.1–17)
IMM GRANULOCYTES # BLD AUTO: 0 K/UL (ref 0–0.04)
IMM GRANULOCYTES NFR BLD AUTO: 0 % (ref 0–0.5)
LYMPHOCYTES # BLD: 1.9 K/UL (ref 0.8–3.5)
LYMPHOCYTES NFR BLD: 40 % (ref 12–49)
MAGNESIUM SERPL-MCNC: 2 MG/DL (ref 1.6–2.4)
MCH RBC QN AUTO: 34 PG (ref 26–34)
MCHC RBC AUTO-ENTMCNC: 35 G/DL (ref 30–36.5)
MCV RBC AUTO: 97.2 FL (ref 80–99)
MONOCYTES # BLD: 0.3 K/UL (ref 0–1)
MONOCYTES NFR BLD: 5 % (ref 5–13)
NEUTS SEG # BLD: 2.4 K/UL (ref 1.8–8)
NEUTS SEG NFR BLD: 51 % (ref 32–75)
NRBC # BLD: 0 K/UL (ref 0–0.01)
NRBC BLD-RTO: 0 PER 100 WBC
PLATELET # BLD AUTO: 190 K/UL (ref 150–400)
PMV BLD AUTO: 9.4 FL (ref 8.9–12.9)
POTASSIUM SERPL-SCNC: 4.4 MMOL/L (ref 3.5–5.1)
PROT SERPL-MCNC: 6.7 G/DL (ref 6.4–8.2)
RBC # BLD AUTO: 4.35 M/UL (ref 4.1–5.7)
SODIUM SERPL-SCNC: 143 MMOL/L (ref 136–145)
TROPONIN I SERPL HS-MCNC: 5 NG/L (ref 0–76)
WBC # BLD AUTO: 4.8 K/UL (ref 4.1–11.1)

## 2024-11-08 PROCEDURE — 99285 EMERGENCY DEPT VISIT HI MDM: CPT

## 2024-11-08 PROCEDURE — 80053 COMPREHEN METABOLIC PANEL: CPT

## 2024-11-08 PROCEDURE — 93005 ELECTROCARDIOGRAM TRACING: CPT | Performed by: STUDENT IN AN ORGANIZED HEALTH CARE EDUCATION/TRAINING PROGRAM

## 2024-11-08 PROCEDURE — 84484 ASSAY OF TROPONIN QUANT: CPT

## 2024-11-08 PROCEDURE — 94761 N-INVAS EAR/PLS OXIMETRY MLT: CPT

## 2024-11-08 PROCEDURE — 36415 COLL VENOUS BLD VENIPUNCTURE: CPT

## 2024-11-08 PROCEDURE — 85379 FIBRIN DEGRADATION QUANT: CPT

## 2024-11-08 PROCEDURE — 85025 COMPLETE CBC W/AUTO DIFF WBC: CPT

## 2024-11-08 PROCEDURE — 71045 X-RAY EXAM CHEST 1 VIEW: CPT

## 2024-11-08 PROCEDURE — 83735 ASSAY OF MAGNESIUM: CPT

## 2024-11-08 ASSESSMENT — PAIN - FUNCTIONAL ASSESSMENT
PAIN_FUNCTIONAL_ASSESSMENT: NONE - DENIES PAIN
PAIN_FUNCTIONAL_ASSESSMENT: NONE - DENIES PAIN

## 2024-11-08 ASSESSMENT — HEART SCORE: ECG: NORMAL

## 2024-11-08 NOTE — DISCHARGE INSTRUCTIONS
You were evaluated in the Emergency Department today for chest pain. Your evaluation has shown no signs of medical conditions requiring emergent intervention at this time, however we recommend that you follow up with your primary care physician or your cardiologist as soon as possible for further testing as an outpatient (including consideration of holter monitor and echocardiogram).    Return to the Emergency Department if you experience worsening or uncontrolled chest pain, shortness of breath, light headedness, feeling faint, nausea, vomiting, or any other concerning symptoms.    Thank you for choosing us for your care.

## 2024-11-08 NOTE — ED PROVIDER NOTES
[facility] EMERGENCY DEPT  EMERGENCY DEPARTMENT ENCOUNTER      Pt Name: Neri Michael  MRN: [id number]  Birthdate 1955  Date of evaluation: 11/8/2024  Provider: Sandra Starkey MD    CHIEF COMPLAINT       Chief Complaint   Patient presents with    Palpitations         HISTORY OF PRESENT ILLNESS    Nursing Triage Notes were reviewed.    HPI    Neri Michael is a 69 y.o. male with a history of right bundle branch block, Grovers disease, hyperlipidemia, alcohol use disorder, tobacco use disorder who presents to the emergency department for evaluation of chest discomfort.  Patient complains that he has been having a pinching sensation in his left side of his chest intermittently every 5 to 10 minutes for the last day.  Reports symptoms started yesterday morning without clear provoking factor.  States that initially when he woke up this morning it appeared symptoms had resolved, however he has been having this discomfort in the left side of his chest frequently since 8:30 AM this morning.  States that he still has been able to tolerate doing yard work and going to the grocery store without difficulty.  Denies any associated lightheadedness, dizziness, dyspnea, or nausea.  Reports he has had similar episodes in the past where he feels like \"my heart stumbles\" every 6 months or so.  Reports that he has seen Dr. Lopez in the past for the same.  Denies having the stumbling or pinching sensation currently.        PAST MEDICAL HISTORY     Past Medical History:   Diagnosis Date    Basal cell carcinoma of back 11/2013    Dr. Tubbs.  Dr. Alcala    Vick's palsy 1968, 2000    x2    COVID-19 vaccine series declined     Dyslexia     Family history of skin cancer     [place]'s disease     Dr. Us    Headache(784.0) 9/9/13    severe right-sided ha neck pain.  CTA normal    History of melanoma in situ 8/22/2019    Hypercholesteremia     took pravastatin 11/2013    Immunization declined     declines pneumococal, flu,  Overall low suspicion for ACS.  Patient denies any known cardiac history, however does have risk factors of hyperlipidemia and tobacco use. No signs of pericarditis on ECG. Patient without dyspnea or infectious symptoms to suggest PE, pneumonia, pulmonary edema, etc. Does not appear volume overloaded. Differential includes cardiomyopathy and valvular disease.     Workup overall reassuring. VSS. Discussed with patient need for outpatient follow-up for consideration of Holter monitor or echocardiogram should symptoms persist. Will discharge home in stable condition with return precautions.           REASSESSMENT   MEDICATIONS GIVEN:   Medications - No data to display    ED Course as of 11/08/24 1749   Fri Nov 08, 2024   1205 EKG documented and interpreted by Sandra Starkey MD as: Normal sinus rhythm, HR approximately 60, regular rate, RBBB, normal axis, no ST segment elevation   [LT]      ED Course User Index  [LT] Sandra Starkey MD           CONSULTS:  None    PROCEDURES:  Unless otherwise noted below, none     Procedures      FINAL IMPRESSION      1. Palpitations          DISPOSITION/PLAN   DISPOSITION Decision To Discharge 11/08/2024 03:04:20 PM    Discharge home with outpatient follow up with PCP and cardiology      PLAN    PATIENT REFERRED TO:   [facility] EMERGENCY DEPT  [address]  [place] [address] [phone]    As needed, If symptoms worsen    Dudley Lopez MD  [address] [facility]  [address]  [facility] [address] [phone]    Schedule an appointment as soon as possible for a visit       [facility] CARDIOLOGY [address]  [address]  [facility] [address] [phone]  Schedule an appointment as soon as possible for a visit       DISCHARGE MEDICATIONS:  Discharge Medication List as of 11/8/2024  3:06 PM          (Please note that portions of this note were completed with a voice recognition program.  Efforts were made to

## 2024-11-08 NOTE — ED TRIAGE NOTES
Pt presents to ED with c/o feeling \"pinches\" in his left chest since yesterday. Pt denies any lightheadedness, dyspnea or other symptoms.

## 2024-11-08 NOTE — TELEPHONE ENCOUNTER
Spoke with patient and he reports yesterday he started to get pinches in his heart. B/P 129/77 this AM and P-61. He reports it is increasing in frequency. Patient has a HX of Right Bundle Branch Block. He is asking for an appointment and advised that he should go to ER for evaluation and treatment. He states understanding and grateful for the call.

## 2024-11-10 LAB
EKG ATRIAL RATE: 59 BPM
EKG DIAGNOSIS: NORMAL
EKG P AXIS: 69 DEGREES
EKG P-R INTERVAL: 142 MS
EKG Q-T INTERVAL: 434 MS
EKG QRS DURATION: 138 MS
EKG QTC CALCULATION (BAZETT): 429 MS
EKG R AXIS: 35 DEGREES
EKG T AXIS: 42 DEGREES
EKG VENTRICULAR RATE: 59 BPM

## 2024-11-10 PROCEDURE — 93010 ELECTROCARDIOGRAM REPORT: CPT | Performed by: SPECIALIST

## 2024-11-12 ENCOUNTER — OFFICE VISIT (OUTPATIENT)
Age: 69
End: 2024-11-12
Payer: MEDICARE

## 2024-11-12 VITALS
RESPIRATION RATE: 19 BRPM | HEIGHT: 71 IN | TEMPERATURE: 98.2 F | OXYGEN SATURATION: 98 % | DIASTOLIC BLOOD PRESSURE: 95 MMHG | BODY MASS INDEX: 26.1 KG/M2 | WEIGHT: 186.4 LBS | HEART RATE: 60 BPM | SYSTOLIC BLOOD PRESSURE: 155 MMHG

## 2024-11-12 DIAGNOSIS — R00.2 PALPITATIONS: ICD-10-CM

## 2024-11-12 DIAGNOSIS — R07.89 ATYPICAL CHEST PAIN: Primary | ICD-10-CM

## 2024-11-12 DIAGNOSIS — I45.10 RBBB (RIGHT BUNDLE BRANCH BLOCK): ICD-10-CM

## 2024-11-12 DIAGNOSIS — N39.498 OTHER URINARY INCONTINENCE: ICD-10-CM

## 2024-11-12 DIAGNOSIS — R51.9 HEADACHE DISORDER: ICD-10-CM

## 2024-11-12 DIAGNOSIS — R42 DIZZINESS: ICD-10-CM

## 2024-11-12 PROCEDURE — 99214 OFFICE O/P EST MOD 30 MIN: CPT | Performed by: INTERNAL MEDICINE

## 2024-11-12 RX ORDER — ASPIRIN 81 MG/1
162 TABLET ORAL DAILY
Qty: 90 TABLET | Refills: 1
Start: 2024-11-12

## 2024-11-12 RX ORDER — ASPIRIN 325 MG
325 TABLET ORAL PRN
COMMUNITY
End: 2024-11-12 | Stop reason: ALTCHOICE

## 2024-11-12 SDOH — ECONOMIC STABILITY: FOOD INSECURITY: WITHIN THE PAST 12 MONTHS, YOU WORRIED THAT YOUR FOOD WOULD RUN OUT BEFORE YOU GOT MONEY TO BUY MORE.: NEVER TRUE

## 2024-11-12 SDOH — ECONOMIC STABILITY: INCOME INSECURITY: HOW HARD IS IT FOR YOU TO PAY FOR THE VERY BASICS LIKE FOOD, HOUSING, MEDICAL CARE, AND HEATING?: NOT HARD AT ALL

## 2024-11-12 SDOH — ECONOMIC STABILITY: FOOD INSECURITY: WITHIN THE PAST 12 MONTHS, THE FOOD YOU BOUGHT JUST DIDN'T LAST AND YOU DIDN'T HAVE MONEY TO GET MORE.: NEVER TRUE

## 2024-11-12 ASSESSMENT — PATIENT HEALTH QUESTIONNAIRE - PHQ9
SUM OF ALL RESPONSES TO PHQ QUESTIONS 1-9: 0
SUM OF ALL RESPONSES TO PHQ9 QUESTIONS 1 & 2: 0
2. FEELING DOWN, DEPRESSED OR HOPELESS: NOT AT ALL
SUM OF ALL RESPONSES TO PHQ QUESTIONS 1-9: 0
1. LITTLE INTEREST OR PLEASURE IN DOING THINGS: NOT AT ALL
SUM OF ALL RESPONSES TO PHQ QUESTIONS 1-9: 0
SUM OF ALL RESPONSES TO PHQ QUESTIONS 1-9: 0

## 2024-11-12 NOTE — PROGRESS NOTES
\"Have you been to the ER, urgent care clinic since your last visit?  Hospitalized since your last visit?\"    YES - When: approximately 1  weeks ago.  Where and Why: [facility] - heart palpitations.    “Have you seen or consulted any other health care providers outside our system since your last visit?”    NO      “Have you had a colorectal cancer screening such as a colonoscopy/FIT/Cologuard?    NO.    Date of last Colonoscopy: 2/14/2017  No cologuard on file  No FIT/FOBT on file   No flexible sigmoidoscopy on file            
  09/01/23 83.1 kg (183 lb 3.2 oz)     Says he had some urinary incontinence last night in bed.  No prior hx of this    Has occasional vertigo once per week for 30 days.    Review of Systems   All other systems reviewed and are negative, except as noted in HPI    Past Medical and Surgical History   has a past medical history of Basal cell carcinoma of back, Bell's palsy, COVID-19 vaccine series declined, Dyslexia, Family history of skin cancer, [place]'s disease, Headache(784.0), History of melanoma in situ, Hypercholesteremia, Immunization declined, Melanoma in situ (HCC), Neck pain on right side, Prostate enlargement, RBBB (right bundle branch block), Skin cancer, Sun-damaged skin, Sunburn, blistering, Tubular adenoma of rectum, and Umbilical hernia.     has a past surgical history that includes orthopedic surgery (1953); Mohs surgery (12/20/2017); other surgical history (11/26/13); and Colonoscopy (02/20/2017).     reports that he has been smoking cigarettes. He started smoking about 59 years ago. He has a 6 pack-year smoking history. He has never been exposed to tobacco smoke. He has never used smokeless tobacco. He reports current alcohol use of about 34.0 standard drinks of alcohol per week. He reports that he does not use drugs.    family history includes Cancer in his father; Heart Disease in his maternal grandmother; Heart Disease (age of onset: 85) in his maternal grandfather; Hypertension in his maternal grandmother.    Physical Exam   Nursing note and vitals reviewed.  Blood pressure (!) 155/95, pulse 60, temperature 98.2 °F (36.8 °C), temperature source Oral, resp. rate 19, height 1.803 m (5' 11\"), weight 84.6 kg (186 lb 6.4 oz), SpO2 98%.  Constitutional:  No distress.    Eyes: Conjunctivae are normal.   Ears:  Hearing grossly intact  Cardiovascular: Normal rate. regular rhythm, no murmurs or gallops  No edema  Pulmonary/Chest: Effort normal.   CTAB  Musculoskeletal: moves all 4 extremities

## 2024-11-13 ENCOUNTER — HOSPITAL ENCOUNTER (OUTPATIENT)
Facility: HOSPITAL | Age: 69
Discharge: HOME | End: 2024-11-16
Attending: INTERNAL MEDICINE
Payer: MEDICARE

## 2024-11-13 DIAGNOSIS — I45.10 RBBB (RIGHT BUNDLE BRANCH BLOCK): ICD-10-CM

## 2024-11-13 DIAGNOSIS — R42 DIZZINESS: ICD-10-CM

## 2024-11-13 DIAGNOSIS — R07.89 ATYPICAL CHEST PAIN: ICD-10-CM

## 2024-11-13 DIAGNOSIS — R00.2 PALPITATIONS: ICD-10-CM

## 2024-11-13 PROCEDURE — 71260 CT THORAX DX C+: CPT

## 2024-11-13 PROCEDURE — 6360000004 HC RX CONTRAST MEDICATION: Performed by: INTERNAL MEDICINE

## 2024-11-13 RX ORDER — IOPAMIDOL 755 MG/ML
100 INJECTION, SOLUTION INTRAVASCULAR
Status: COMPLETED | OUTPATIENT
Start: 2024-11-13 | End: 2024-11-13

## 2024-11-13 RX ADMIN — IOPAMIDOL 100 ML: 755 INJECTION, SOLUTION INTRAVENOUS at 15:02

## 2025-01-07 ENCOUNTER — TELEPHONE (OUTPATIENT)
Age: 70
End: 2025-01-07

## 2025-01-13 ENCOUNTER — OFFICE VISIT (OUTPATIENT)
Age: 70
End: 2025-01-13
Payer: MEDICARE

## 2025-01-13 VITALS
WEIGHT: 184 LBS | BODY MASS INDEX: 25.76 KG/M2 | SYSTOLIC BLOOD PRESSURE: 132 MMHG | HEIGHT: 71 IN | DIASTOLIC BLOOD PRESSURE: 80 MMHG | OXYGEN SATURATION: 98 % | HEART RATE: 64 BPM

## 2025-01-13 DIAGNOSIS — I45.10 RBBB: ICD-10-CM

## 2025-01-13 DIAGNOSIS — I25.10 CORONARY ARTERY CALCIFICATION: ICD-10-CM

## 2025-01-13 DIAGNOSIS — I10 ESSENTIAL HYPERTENSION: Primary | ICD-10-CM

## 2025-01-13 DIAGNOSIS — R07.9 CHEST PAIN, UNSPECIFIED TYPE: ICD-10-CM

## 2025-01-13 PROCEDURE — G8419 CALC BMI OUT NRM PARAM NOF/U: HCPCS | Performed by: STUDENT IN AN ORGANIZED HEALTH CARE EDUCATION/TRAINING PROGRAM

## 2025-01-13 PROCEDURE — 93005 ELECTROCARDIOGRAM TRACING: CPT | Performed by: STUDENT IN AN ORGANIZED HEALTH CARE EDUCATION/TRAINING PROGRAM

## 2025-01-13 PROCEDURE — 3017F COLORECTAL CA SCREEN DOC REV: CPT | Performed by: STUDENT IN AN ORGANIZED HEALTH CARE EDUCATION/TRAINING PROGRAM

## 2025-01-13 PROCEDURE — 1123F ACP DISCUSS/DSCN MKR DOCD: CPT | Performed by: STUDENT IN AN ORGANIZED HEALTH CARE EDUCATION/TRAINING PROGRAM

## 2025-01-13 PROCEDURE — 1126F AMNT PAIN NOTED NONE PRSNT: CPT | Performed by: STUDENT IN AN ORGANIZED HEALTH CARE EDUCATION/TRAINING PROGRAM

## 2025-01-13 PROCEDURE — 99203 OFFICE O/P NEW LOW 30 MIN: CPT | Performed by: STUDENT IN AN ORGANIZED HEALTH CARE EDUCATION/TRAINING PROGRAM

## 2025-01-13 PROCEDURE — 3075F SYST BP GE 130 - 139MM HG: CPT | Performed by: STUDENT IN AN ORGANIZED HEALTH CARE EDUCATION/TRAINING PROGRAM

## 2025-01-13 PROCEDURE — 4004F PT TOBACCO SCREEN RCVD TLK: CPT | Performed by: STUDENT IN AN ORGANIZED HEALTH CARE EDUCATION/TRAINING PROGRAM

## 2025-01-13 PROCEDURE — 1159F MED LIST DOCD IN RCRD: CPT | Performed by: STUDENT IN AN ORGANIZED HEALTH CARE EDUCATION/TRAINING PROGRAM

## 2025-01-13 PROCEDURE — 93010 ELECTROCARDIOGRAM REPORT: CPT | Performed by: STUDENT IN AN ORGANIZED HEALTH CARE EDUCATION/TRAINING PROGRAM

## 2025-01-13 PROCEDURE — G8427 DOCREV CUR MEDS BY ELIG CLIN: HCPCS | Performed by: STUDENT IN AN ORGANIZED HEALTH CARE EDUCATION/TRAINING PROGRAM

## 2025-01-13 PROCEDURE — 3079F DIAST BP 80-89 MM HG: CPT | Performed by: STUDENT IN AN ORGANIZED HEALTH CARE EDUCATION/TRAINING PROGRAM

## 2025-01-13 NOTE — PROGRESS NOTES
Chief Complaint   Patient presents with    Chest Pain    Palpitations    RBBB     Vitals:    01/13/25 1346   BP: 132/80   Site: Left Upper Arm   Position: Sitting   Pulse: 64   SpO2: 98%   Weight: 83.5 kg (184 lb)   Height: 1.803 m (5' 11\")         Chest pain: DENIED     Recent hospital stays: DENIED     Refills: DENIED

## 2025-01-13 NOTE — PROGRESS NOTES
[place]  [id number] [facility], [address]  [place] [address]    Office [phone],Fax [phone]           Neri Michael is a 69 y.o. male presents the office for evaluation of chest pain.  Consult requested by Dudley Lopez MD  .      Assessment/Recommendations:     Diagnosis Orders   1. Essential hypertension  EKG 12 Lead    Nuclear stress test with myocardial perfusion      2. RBBB  Nuclear stress test with myocardial perfusion      3. Chest pain, unspecified type        4. Coronary artery calcification            Resolved atypical chest pain     Coronary artery calcifications without ongoing symptoms of anginal chest pain.  Noted on recent chest CTA    Right bundle branch block    -Recommend to proceed with exercise stress myocardial perfusion imaging for further evaluation of his coronary artery calcifications in the setting of atypical chest pain symptoms  -Long-term likely will benefit some degree of statin therapy pending clinical course      Primary Care Physician- Dudley Lopez MD    Follow-up after above testing        Subjective:  69-year-old gentleman presents to the office for evaluation of chest pain symptoms.  He had several episodes of sharp left-sided chest pain symptoms several weeks ago that have since resolved.  He was evaluated with a CTA of his chest which showed no significant pulmonary embolism, however showed evidence of coronary artery calcifications.  He is active without any specific exercise regimen.  He is without any anginal chest pain symptoms.  No exertional dyspnea.  No family history of coronary artery disease.  No history of syncope or near syncope      Past Medical History:   Diagnosis Date    Basal cell carcinoma of back 11/2013    Dr. Tubbs.  Dr. Alcala    Vick's palsy 1968, 2000    x2    COVID-19 vaccine series declined     Dyslexia     Family history of skin cancer     [place]'s disease     Dr. Us    Headache(784.0) 9/9/13

## 2025-03-20 ENCOUNTER — TELEPHONE (OUTPATIENT)
Facility: CLINIC | Age: 70
End: 2025-03-20

## 2025-03-20 NOTE — TELEPHONE ENCOUNTER
Attempted to contact patient regarding upcoming Medicare wellness appointment and completion of HRA questionnaire. LVM for patient to please return call at  [phone]